# Patient Record
Sex: FEMALE | Race: WHITE | NOT HISPANIC OR LATINO
[De-identification: names, ages, dates, MRNs, and addresses within clinical notes are randomized per-mention and may not be internally consistent; named-entity substitution may affect disease eponyms.]

---

## 2017-12-01 ENCOUNTER — RX RENEWAL (OUTPATIENT)
Age: 82
End: 2017-12-01

## 2017-12-15 ENCOUNTER — RX RENEWAL (OUTPATIENT)
Age: 82
End: 2017-12-15

## 2017-12-19 ENCOUNTER — RECORD ABSTRACTING (OUTPATIENT)
Age: 82
End: 2017-12-19

## 2017-12-19 DIAGNOSIS — Z83.3 FAMILY HISTORY OF DIABETES MELLITUS: ICD-10-CM

## 2017-12-19 DIAGNOSIS — Z80.0 FAMILY HISTORY OF MALIGNANT NEOPLASM OF DIGESTIVE ORGANS: ICD-10-CM

## 2017-12-19 DIAGNOSIS — Z82.3 FAMILY HISTORY OF STROKE: ICD-10-CM

## 2017-12-19 DIAGNOSIS — Z82.49 FAMILY HISTORY OF ISCHEMIC HEART DISEASE AND OTHER DISEASES OF THE CIRCULATORY SYSTEM: ICD-10-CM

## 2017-12-19 DIAGNOSIS — Z87.891 PERSONAL HISTORY OF NICOTINE DEPENDENCE: ICD-10-CM

## 2017-12-19 DIAGNOSIS — Z81.1 FAMILY HISTORY OF ALCOHOL ABUSE AND DEPENDENCE: ICD-10-CM

## 2017-12-19 DIAGNOSIS — Z80.3 FAMILY HISTORY OF MALIGNANT NEOPLASM OF BREAST: ICD-10-CM

## 2017-12-19 DIAGNOSIS — Z80.8 FAMILY HISTORY OF MALIGNANT NEOPLASM OF OTHER ORGANS OR SYSTEMS: ICD-10-CM

## 2017-12-19 DIAGNOSIS — Z81.8 FAMILY HISTORY OF OTHER MENTAL AND BEHAVIORAL DISORDERS: ICD-10-CM

## 2018-01-25 ENCOUNTER — RX RENEWAL (OUTPATIENT)
Age: 83
End: 2018-01-25

## 2018-03-14 ENCOUNTER — RX RENEWAL (OUTPATIENT)
Age: 83
End: 2018-03-14

## 2018-04-02 ENCOUNTER — APPOINTMENT (OUTPATIENT)
Dept: FAMILY MEDICINE | Facility: CLINIC | Age: 83
End: 2018-04-02
Payer: MEDICARE

## 2018-04-02 VITALS
SYSTOLIC BLOOD PRESSURE: 120 MMHG | WEIGHT: 155 LBS | DIASTOLIC BLOOD PRESSURE: 78 MMHG | HEIGHT: 67 IN | BODY MASS INDEX: 24.33 KG/M2

## 2018-04-02 PROCEDURE — 99213 OFFICE O/P EST LOW 20 MIN: CPT | Mod: 25

## 2018-04-02 PROCEDURE — 36415 COLL VENOUS BLD VENIPUNCTURE: CPT

## 2018-04-03 LAB
ALBUMIN SERPL ELPH-MCNC: 4.2 G/DL
ALP BLD-CCNC: 51 U/L
ALT SERPL-CCNC: 23 U/L
ANION GAP SERPL CALC-SCNC: 15 MMOL/L
AST SERPL-CCNC: 27 U/L
BILIRUB SERPL-MCNC: 1 MG/DL
BUN SERPL-MCNC: 14 MG/DL
CALCIUM SERPL-MCNC: 9.4 MG/DL
CHLORIDE SERPL-SCNC: 104 MMOL/L
CHOLEST SERPL-MCNC: 125 MG/DL
CHOLEST/HDLC SERPL: 1.9 RATIO
CO2 SERPL-SCNC: 26 MMOL/L
CREAT SERPL-MCNC: 0.96 MG/DL
GLUCOSE SERPL-MCNC: 101 MG/DL
HDLC SERPL-MCNC: 67 MG/DL
LDLC SERPL CALC-MCNC: 47 MG/DL
POTASSIUM SERPL-SCNC: 4.3 MMOL/L
PROT SERPL-MCNC: 7.4 G/DL
SODIUM SERPL-SCNC: 145 MMOL/L
TRIGL SERPL-MCNC: 57 MG/DL

## 2018-05-26 ENCOUNTER — RX RENEWAL (OUTPATIENT)
Age: 83
End: 2018-05-26

## 2018-05-29 ENCOUNTER — RX RENEWAL (OUTPATIENT)
Age: 83
End: 2018-05-29

## 2018-07-27 PROBLEM — Z80.8 FAMILY HISTORY OF SKIN CANCER: Status: ACTIVE | Noted: 2017-12-19

## 2018-08-26 ENCOUNTER — RX RENEWAL (OUTPATIENT)
Age: 83
End: 2018-08-26

## 2018-09-24 ENCOUNTER — APPOINTMENT (OUTPATIENT)
Dept: FAMILY MEDICINE | Facility: CLINIC | Age: 83
End: 2018-09-24
Payer: MEDICARE

## 2018-09-24 ENCOUNTER — NON-APPOINTMENT (OUTPATIENT)
Age: 83
End: 2018-09-24

## 2018-09-24 VITALS
HEIGHT: 67 IN | BODY MASS INDEX: 23.86 KG/M2 | SYSTOLIC BLOOD PRESSURE: 122 MMHG | WEIGHT: 152 LBS | DIASTOLIC BLOOD PRESSURE: 84 MMHG

## 2018-09-24 LAB
BILIRUB UR QL STRIP: 0
CLARITY UR: CLEAR
COLLECTION METHOD: NORMAL
GLUCOSE UR-MCNC: 0
HCG UR QL: 0.2 EU/DL
HGB UR QL STRIP.AUTO: 0
KETONES UR-MCNC: 0
LEUKOCYTE ESTERASE UR QL STRIP: NORMAL
NITRITE UR QL STRIP: 0
PH UR STRIP: 7
PROT UR STRIP-MCNC: 0
SP GR UR STRIP: 1.01

## 2018-09-24 PROCEDURE — 92551 PURE TONE HEARING TEST AIR: CPT | Mod: 59,GY

## 2018-09-24 PROCEDURE — 99213 OFFICE O/P EST LOW 20 MIN: CPT | Mod: 25

## 2018-09-24 PROCEDURE — 36415 COLL VENOUS BLD VENIPUNCTURE: CPT

## 2018-09-24 PROCEDURE — G0439: CPT

## 2018-09-24 PROCEDURE — G0008: CPT

## 2018-09-24 PROCEDURE — 90686 IIV4 VACC NO PRSV 0.5 ML IM: CPT

## 2018-09-24 PROCEDURE — 93000 ELECTROCARDIOGRAM COMPLETE: CPT

## 2018-09-24 PROCEDURE — 81002 URINALYSIS NONAUTO W/O SCOPE: CPT

## 2018-09-24 NOTE — HEALTH RISK ASSESSMENT
[Very Good] : ~his/her~  mood as very good [One fall no injury in past year] : Patient reported one fall in the past year without injury [0] : 2) Feeling down, depressed, or hopeless: Not at all (0) [Patient reported colonoscopy was normal] : Patient reported colonoscopy was normal [None] : None [Alone] : lives alone [Retired] : retired [College] : College [] :  [# Of Children ___] : has [unfilled] children [Feels Safe at Home] : Feels safe at home [Fully functional (bathing, dressing, toileting, transferring, walking, feeding)] : Fully functional (bathing, dressing, toileting, transferring, walking, feeding) [Fully functional (using the telephone, shopping, preparing meals, housekeeping, doing laundry, using] : Fully functional and needs no help or supervision to perform IADLs (using the telephone, shopping, preparing meals, housekeeping, doing laundry, using transportation, managing medications and managing finances) [Smoke Detector] : smoke detector [Carbon Monoxide Detector] : carbon monoxide detector [Guns at Home] : guns at home [Seat Belt] :  uses seat belt [Sunscreen] : uses sunscreen [Discussed at today's visit] : Advance Directives Discussed at today's visit [Designated Healthcare Proxy] : Designated healthcare proxy [] : No [[id number]] : 0 [Change in mental status noted] : No change in mental status noted [Language] : denies difficulty with language [Behavior] : denies difficulty with behavior [Handling Complex Tasks] : denies difficulty handling complex tasks [High Risk Behavior] : no high risk behavior [Reports changes in hearing] : Reports no changes in hearing [Reports changes in vision] : Reports no changes in vision [Reports changes in dental health] : Reports no changes in dental health [Caregiver Concerns] : does not have caregiver concerns [ColonoscopyDate] : 03/14 [de-identified] : locked

## 2018-09-24 NOTE — REVIEW OF SYSTEMS
[Postnasal Drip] : postnasal drip [Negative] : Heme/Lymph [Nosebleed] : no nosebleeds [Nasal Discharge] : no nasal discharge [Sore Throat] : no sore throat

## 2018-09-24 NOTE — PHYSICAL EXAM

## 2018-09-26 LAB
ALBUMIN SERPL ELPH-MCNC: 4.3 G/DL
ALP BLD-CCNC: 53 U/L
ALT SERPL-CCNC: 14 U/L
ANION GAP SERPL CALC-SCNC: 16 MMOL/L
AST SERPL-CCNC: 22 U/L
BACTERIA UR CULT: ABNORMAL
BASOPHILS # BLD AUTO: 0.03 K/UL
BASOPHILS NFR BLD AUTO: 0.6 %
BILIRUB SERPL-MCNC: 1.4 MG/DL
BUN SERPL-MCNC: 12 MG/DL
CALCIUM SERPL-MCNC: 9.3 MG/DL
CHLORIDE SERPL-SCNC: 100 MMOL/L
CHOLEST SERPL-MCNC: 147 MG/DL
CHOLEST/HDLC SERPL: 1.9 RATIO
CO2 SERPL-SCNC: 27 MMOL/L
CREAT SERPL-MCNC: 0.78 MG/DL
EOSINOPHIL # BLD AUTO: 0.15 K/UL
EOSINOPHIL NFR BLD AUTO: 2.9 %
GLUCOSE SERPL-MCNC: 94 MG/DL
HCT VFR BLD CALC: 45.4 %
HDLC SERPL-MCNC: 76 MG/DL
HGB BLD-MCNC: 14.4 G/DL
IMM GRANULOCYTES NFR BLD AUTO: 0.2 %
LDLC SERPL CALC-MCNC: 55 MG/DL
LYMPHOCYTES # BLD AUTO: 1.12 K/UL
LYMPHOCYTES NFR BLD AUTO: 21.4 %
MAN DIFF?: NORMAL
MCHC RBC-ENTMCNC: 31.4 PG
MCHC RBC-ENTMCNC: 31.7 GM/DL
MCV RBC AUTO: 99.1 FL
MONOCYTES # BLD AUTO: 0.38 K/UL
MONOCYTES NFR BLD AUTO: 7.3 %
NEUTROPHILS # BLD AUTO: 3.55 K/UL
NEUTROPHILS NFR BLD AUTO: 67.6 %
PLATELET # BLD AUTO: 224 K/UL
POTASSIUM SERPL-SCNC: 4.5 MMOL/L
PROT SERPL-MCNC: 7.3 G/DL
RBC # BLD: 4.58 M/UL
RBC # FLD: 13.3 %
SODIUM SERPL-SCNC: 143 MMOL/L
T3FREE SERPL-MCNC: 2.64 PG/ML
T4 FREE SERPL-MCNC: 1.5 NG/DL
TRIGL SERPL-MCNC: 79 MG/DL
TSH SERPL-ACNC: 1.3 UIU/ML
WBC # FLD AUTO: 5.24 K/UL

## 2018-11-23 ENCOUNTER — RX RENEWAL (OUTPATIENT)
Age: 83
End: 2018-11-23

## 2018-11-24 ENCOUNTER — RX RENEWAL (OUTPATIENT)
Age: 83
End: 2018-11-24

## 2018-12-17 ENCOUNTER — APPOINTMENT (OUTPATIENT)
Dept: FAMILY MEDICINE | Facility: CLINIC | Age: 83
End: 2018-12-17
Payer: MEDICARE

## 2018-12-17 VITALS
DIASTOLIC BLOOD PRESSURE: 98 MMHG | WEIGHT: 152 LBS | BODY MASS INDEX: 23.86 KG/M2 | HEIGHT: 67 IN | SYSTOLIC BLOOD PRESSURE: 148 MMHG

## 2018-12-17 PROCEDURE — 99214 OFFICE O/P EST MOD 30 MIN: CPT

## 2018-12-17 NOTE — PLAN
[FreeTextEntry1] : Discussed potentially increasing Lexapro to 20, pt wants to stay on 10 for now. Will recheck if getting worse or persistent\par BP check 2-3

## 2018-12-17 NOTE — HISTORY OF PRESENT ILLNESS
[Hypertension] : Hypertension [Checks BP Sporadically] : The patient checks ~his/her~ blood pressure sporadically [<140/90] : Target blood pressure is <140/90 [At intermediate goal] : BP at intermediate goal [Doing Well] : Patient is doing well [Managed with medications] : managed with  medication [Moderate Intensity Therapy] : Patient is currently on moderate intensity statin  therapy [FreeTextEntry6] : BP check. [de-identified] : Cardiologist checked recently and BP high

## 2018-12-17 NOTE — HEALTH RISK ASSESSMENT
[] : No [Two or more falls in past year] : Patient reported two or more falls in the past year [2] : 2) Feeling down, depressed, or hopeless for more than half of the days (2) [[id number]] : 9

## 2019-01-07 ENCOUNTER — APPOINTMENT (OUTPATIENT)
Dept: FAMILY MEDICINE | Facility: CLINIC | Age: 84
End: 2019-01-07
Payer: MEDICARE

## 2019-01-07 VITALS
HEIGHT: 67 IN | HEART RATE: 80 BPM | DIASTOLIC BLOOD PRESSURE: 80 MMHG | WEIGHT: 152 LBS | BODY MASS INDEX: 23.86 KG/M2 | SYSTOLIC BLOOD PRESSURE: 150 MMHG

## 2019-01-07 PROCEDURE — 99213 OFFICE O/P EST LOW 20 MIN: CPT

## 2019-01-07 NOTE — PLAN
[FreeTextEntry1] : take 2 of the coreg and if tolerates, then next refill will send in to pharmacy for 25's

## 2019-01-07 NOTE — PHYSICAL EXAM
[No Acute Distress] : no acute distress [Normal Outer Ear/Nose] : the outer ears and nose were normal in appearance [No JVD] : no jugular venous distention [Soft] : abdomen soft [No CVA Tenderness] : no CVA  tenderness [No Joint Swelling] : no joint swelling

## 2019-01-07 NOTE — HISTORY OF PRESENT ILLNESS
[Hypertension] : Hypertension [Does not check BP] : The patient is not checking blood pressure [<140/90] : Target blood pressure is <140/90 [Near target goal] : BP near target goal [FreeTextEntry6] : BP check. Pt. started new medication last month, here for recheck.

## 2019-01-28 ENCOUNTER — APPOINTMENT (OUTPATIENT)
Dept: FAMILY MEDICINE | Facility: CLINIC | Age: 84
End: 2019-01-28
Payer: MEDICARE

## 2019-01-28 VITALS — SYSTOLIC BLOOD PRESSURE: 124 MMHG | DIASTOLIC BLOOD PRESSURE: 82 MMHG

## 2019-01-28 VITALS — WEIGHT: 152 LBS | BODY MASS INDEX: 23.86 KG/M2 | HEIGHT: 67 IN

## 2019-01-28 PROCEDURE — 36415 COLL VENOUS BLD VENIPUNCTURE: CPT

## 2019-01-28 PROCEDURE — 99213 OFFICE O/P EST LOW 20 MIN: CPT | Mod: 25

## 2019-01-28 NOTE — PHYSICAL EXAM
[No Respiratory Distress] : no respiratory distress  [Clear to Auscultation] : lungs were clear to auscultation bilaterally [Normal Rate] : normal rate  [Regular Rhythm] : with a regular rhythm [Normal S1, S2] : normal S1 and S2

## 2019-01-28 NOTE — HEALTH RISK ASSESSMENT
[One fall no injury in past year] : Patient reported one fall in the past year without injury [0] : 2) Feeling down, depressed, or hopeless: Not at all (0) [] : No [[id number]] : 0

## 2019-01-28 NOTE — COUNSELING
[Weight management counseling provided] : Weight management [Healthy eating counseling provided] : healthy eating [Smoking cessation counseling provided] : smoking cessation [Behavioral health counseling provided] : behavioral health  [Walking] : Walking [Low Salt Diet] : Low salt diet [None] : None [Good understanding] : Patient has a good understanding of lifestyle changes and the steps needed to achieve self management goals

## 2019-01-28 NOTE — HISTORY OF PRESENT ILLNESS
[Hypertension] : Hypertension [Patient was last seen on ___] : Patient was last seen on [unfilled] [Spouse] : spouse [Does not check BP] : The patient is not checking blood pressure [120/80] : Target blood pressure is 120/80 [At intermediate goal] : BP at intermediate goal [FreeTextEntry6] : BP check. [de-identified] : Machine stopped working properly

## 2019-03-12 ENCOUNTER — APPOINTMENT (OUTPATIENT)
Dept: FAMILY MEDICINE | Facility: CLINIC | Age: 84
End: 2019-03-12
Payer: MEDICARE

## 2019-03-12 VITALS
HEIGHT: 67 IN | DIASTOLIC BLOOD PRESSURE: 72 MMHG | SYSTOLIC BLOOD PRESSURE: 110 MMHG | WEIGHT: 152 LBS | BODY MASS INDEX: 23.86 KG/M2

## 2019-03-12 PROCEDURE — 36415 COLL VENOUS BLD VENIPUNCTURE: CPT

## 2019-03-12 PROCEDURE — 99213 OFFICE O/P EST LOW 20 MIN: CPT | Mod: 25

## 2019-03-12 NOTE — HISTORY OF PRESENT ILLNESS
[Hyperlipidemia] : Hyperlipidemia [Hypertension] : Hypertension [Does not check BP] : The patient is not checking blood pressure [<140/90] : Target blood pressure is <140/90 [Target goal met] : BP target goal met [Doing Well] : Patient is doing well [Managed with medications] : managed with  medication [High Intensity therapy] : Patient is currently on high intensity statin therapy [FreeTextEntry6] : Fasting cholesterol check.

## 2019-03-13 LAB
ALBUMIN SERPL ELPH-MCNC: 4.4 G/DL
ALP BLD-CCNC: 53 U/L
ALT SERPL-CCNC: 16 U/L
ANION GAP SERPL CALC-SCNC: 12 MMOL/L
AST SERPL-CCNC: 23 U/L
BILIRUB SERPL-MCNC: 0.9 MG/DL
BUN SERPL-MCNC: 16 MG/DL
CALCIUM SERPL-MCNC: 9.6 MG/DL
CHLORIDE SERPL-SCNC: 102 MMOL/L
CHOLEST SERPL-MCNC: 154 MG/DL
CHOLEST/HDLC SERPL: 1.9 RATIO
CO2 SERPL-SCNC: 30 MMOL/L
CREAT SERPL-MCNC: 0.9 MG/DL
GLUCOSE SERPL-MCNC: 100 MG/DL
HDLC SERPL-MCNC: 81 MG/DL
LDLC SERPL CALC-MCNC: 58 MG/DL
POTASSIUM SERPL-SCNC: 4.8 MMOL/L
PROT SERPL-MCNC: 7.6 G/DL
SODIUM SERPL-SCNC: 144 MMOL/L
TRIGL SERPL-MCNC: 76 MG/DL

## 2019-03-14 ENCOUNTER — RX RENEWAL (OUTPATIENT)
Age: 84
End: 2019-03-14

## 2019-03-17 ENCOUNTER — RX RENEWAL (OUTPATIENT)
Age: 84
End: 2019-03-17

## 2019-03-17 RX ORDER — CARVEDILOL 12.5 MG/1
12.5 TABLET, FILM COATED ORAL TWICE DAILY
Qty: 180 | Refills: 0 | Status: DISCONTINUED | COMMUNITY
Start: 2019-03-14 | End: 2019-03-17

## 2019-05-25 ENCOUNTER — RX RENEWAL (OUTPATIENT)
Age: 84
End: 2019-05-25

## 2019-06-10 ENCOUNTER — RX RENEWAL (OUTPATIENT)
Age: 84
End: 2019-06-10

## 2019-07-15 ENCOUNTER — APPOINTMENT (OUTPATIENT)
Dept: FAMILY MEDICINE | Facility: CLINIC | Age: 84
End: 2019-07-15
Payer: MEDICARE

## 2019-07-15 VITALS
WEIGHT: 152 LBS | HEIGHT: 67 IN | DIASTOLIC BLOOD PRESSURE: 80 MMHG | BODY MASS INDEX: 23.86 KG/M2 | SYSTOLIC BLOOD PRESSURE: 146 MMHG

## 2019-07-15 PROCEDURE — 99213 OFFICE O/P EST LOW 20 MIN: CPT

## 2019-07-15 NOTE — PLAN
[FreeTextEntry1] : Watch sodium\par Increase exercise\par Recheck BP in 2-3 more weeks. IF still high will increase the lisinopril

## 2019-07-15 NOTE — HISTORY OF PRESENT ILLNESS
[Hypertension] : Hypertension [FreeTextEntry6] : BP check. \par Was in the office with Dr. Polo and BP was slightly elevated. Pt. Not sure what the reading was. He did not change the dosage at that time. Here for recheck.

## 2019-07-23 ENCOUNTER — RX RENEWAL (OUTPATIENT)
Age: 84
End: 2019-07-23

## 2019-07-29 ENCOUNTER — APPOINTMENT (OUTPATIENT)
Dept: FAMILY MEDICINE | Facility: CLINIC | Age: 84
End: 2019-07-29
Payer: MEDICARE

## 2019-07-29 VITALS
DIASTOLIC BLOOD PRESSURE: 68 MMHG | WEIGHT: 152 LBS | SYSTOLIC BLOOD PRESSURE: 120 MMHG | HEIGHT: 67 IN | BODY MASS INDEX: 23.86 KG/M2

## 2019-07-29 PROCEDURE — 99213 OFFICE O/P EST LOW 20 MIN: CPT

## 2019-07-29 NOTE — HISTORY OF PRESENT ILLNESS
[Hypertension] : Hypertension [FreeTextEntry6] : BP check. [Does not check BP] : The patient is not checking blood pressure [<140/90] : Target blood pressure is <140/90 [Target goal met] : BP target goal met

## 2019-07-29 NOTE — HEALTH RISK ASSESSMENT
[] : No [Yes] : Yes [4 or more  times a week (4 pts)] : 4 or more  times a week (4 points) [1 or 2 (0 pts)] : 1 or 2 (0 points) [de-identified] : SBIRT screen on chart and patient appropriately counseled

## 2019-09-07 ENCOUNTER — RX RENEWAL (OUTPATIENT)
Age: 84
End: 2019-09-07

## 2019-10-02 ENCOUNTER — APPOINTMENT (OUTPATIENT)
Dept: FAMILY MEDICINE | Facility: CLINIC | Age: 84
End: 2019-10-02
Payer: MEDICARE

## 2019-10-02 VITALS
DIASTOLIC BLOOD PRESSURE: 68 MMHG | SYSTOLIC BLOOD PRESSURE: 110 MMHG | WEIGHT: 152 LBS | HEIGHT: 67 IN | BODY MASS INDEX: 23.86 KG/M2

## 2019-10-02 PROCEDURE — G0008: CPT

## 2019-10-02 PROCEDURE — 90662 IIV NO PRSV INCREASED AG IM: CPT

## 2019-10-02 PROCEDURE — 99213 OFFICE O/P EST LOW 20 MIN: CPT | Mod: 25

## 2019-10-02 NOTE — HEALTH RISK ASSESSMENT
[Yes] : Yes [4 or more  times a week (4 pts)] : 4 or more  times a week (4 points) [1 or 2 (0 pts)] : 1 or 2 (0 points) [Never (0 pts)] : Never (0 points) [No] : In the past 12 months have you used drugs other than those required for medical reasons? No [No falls in past year] : Patient reported no falls in the past year [0] : 2) Feeling down, depressed, or hopeless: Not at all (0) [] : No [Audit-CScore] : 4 [[id number]] : 0

## 2019-10-02 NOTE — HISTORY OF PRESENT ILLNESS
[FreeTextEntry8] : Flu vaccine.\par Pt. feeling well and has not had any recent colds or stomach viruses\par Bp check

## 2019-10-16 ENCOUNTER — NON-APPOINTMENT (OUTPATIENT)
Age: 84
End: 2019-10-16

## 2019-10-16 ENCOUNTER — APPOINTMENT (OUTPATIENT)
Dept: FAMILY MEDICINE | Facility: CLINIC | Age: 84
End: 2019-10-16
Payer: MEDICARE

## 2019-10-16 VITALS
WEIGHT: 150 LBS | HEIGHT: 67 IN | SYSTOLIC BLOOD PRESSURE: 120 MMHG | BODY MASS INDEX: 23.54 KG/M2 | DIASTOLIC BLOOD PRESSURE: 78 MMHG

## 2019-10-16 LAB
BILIRUB UR QL STRIP: 0
CLARITY UR: CLEAR
COLLECTION METHOD: NORMAL
GLUCOSE UR-MCNC: 0
HCG UR QL: 0.2 EU/DL
HGB UR QL STRIP.AUTO: 0
KETONES UR-MCNC: 0
LEUKOCYTE ESTERASE UR QL STRIP: NORMAL
NITRITE UR QL STRIP: 0
PH UR STRIP: 7.5
PROT UR STRIP-MCNC: 0
SP GR UR STRIP: 1.01

## 2019-10-16 PROCEDURE — G0439: CPT

## 2019-10-16 PROCEDURE — G0442 ANNUAL ALCOHOL SCREEN 15 MIN: CPT | Mod: 59

## 2019-10-16 PROCEDURE — 93000 ELECTROCARDIOGRAM COMPLETE: CPT

## 2019-10-16 PROCEDURE — 36415 COLL VENOUS BLD VENIPUNCTURE: CPT

## 2019-10-16 PROCEDURE — 81002 URINALYSIS NONAUTO W/O SCOPE: CPT

## 2019-10-16 PROCEDURE — G0444 DEPRESSION SCREEN ANNUAL: CPT | Mod: 59

## 2019-10-16 PROCEDURE — G0443: CPT | Mod: 59

## 2019-10-16 PROCEDURE — 99213 OFFICE O/P EST LOW 20 MIN: CPT | Mod: 25

## 2019-10-16 NOTE — PHYSICAL EXAM
[No Acute Distress] : no acute distress [Well Nourished] : well nourished [Well Developed] : well developed [Well-Appearing] : well-appearing [Normal Sclera/Conjunctiva] : normal sclera/conjunctiva [PERRL] : pupils equal round and reactive to light [EOMI] : extraocular movements intact [Normal Outer Ear/Nose] : the outer ears and nose were normal in appearance [Normal Oropharynx] : the oropharynx was normal [No JVD] : no jugular venous distention [No Lymphadenopathy] : no lymphadenopathy [Supple] : supple [Thyroid Normal, No Nodules] : the thyroid was normal and there were no nodules present [No Respiratory Distress] : no respiratory distress  [No Accessory Muscle Use] : no accessory muscle use [Clear to Auscultation] : lungs were clear to auscultation bilaterally [Normal Rate] : normal rate  [Regular Rhythm] : with a regular rhythm [Normal S1, S2] : normal S1 and S2 [No Murmur] : no murmur heard [No Carotid Bruits] : no carotid bruits [No Abdominal Bruit] : a ~M bruit was not heard ~T in the abdomen [No Varicosities] : no varicosities [Pedal Pulses Present] : the pedal pulses are present [No Edema] : there was no peripheral edema [No Palpable Aorta] : no palpable aorta [No Extremity Clubbing/Cyanosis] : no extremity clubbing/cyanosis [Soft] : abdomen soft [Non Tender] : non-tender [Non-distended] : non-distended [No Masses] : no abdominal mass palpated [No HSM] : no HSM [Normal Bowel Sounds] : normal bowel sounds [Normal Posterior Cervical Nodes] : no posterior cervical lymphadenopathy [Normal Anterior Cervical Nodes] : no anterior cervical lymphadenopathy [No CVA Tenderness] : no CVA  tenderness [No Spinal Tenderness] : no spinal tenderness [No Joint Swelling] : no joint swelling [Grossly Normal Strength/Tone] : grossly normal strength/tone [No Rash] : no rash [Coordination Grossly Intact] : coordination grossly intact [No Focal Deficits] : no focal deficits [Normal Gait] : normal gait [Deep Tendon Reflexes (DTR)] : deep tendon reflexes were 2+ and symmetric [Normal Affect] : the affect was normal [Normal Insight/Judgement] : insight and judgment were intact [de-identified] : declined [FreeTextEntry1] : 500 R 30 L 20   1000 R 30 L 30   2000 R 40 L 20   4000 R 45 L 40

## 2019-10-16 NOTE — HEALTH RISK ASSESSMENT
[Good] : ~his/her~  mood as  good [Yes] : Yes [2 - 3 times a week (3 pts)] : 2 - 3  times a week (3 points) [1 or 2 (0 pts)] : 1 or 2 (0 points) [Never (0 pts)] : Never (0 points) [No] : In the past 12 months have you used drugs other than those required for medical reasons? No [No falls in past year] : Patient reported no falls in the past year [0] : 2) Feeling down, depressed, or hopeless: Not at all (0) [HIV test declined] : HIV test declined [Hepatitis C test declined] : Hepatitis C test declined [Change in mental status noted] : Change in mental status noted [None] : None [With Family] : lives with family [Retired] : retired [College] : College [] :  [# Of Children ___] : has [unfilled] children [Sexually Active] : sexually active [Feels Safe at Home] : Feels safe at home [Fully functional (bathing, dressing, toileting, transferring, walking, feeding)] : Fully functional (bathing, dressing, toileting, transferring, walking, feeding) [Fully functional (using the telephone, shopping, preparing meals, housekeeping, doing laundry, using] : Fully functional and needs no help or supervision to perform IADLs (using the telephone, shopping, preparing meals, housekeeping, doing laundry, using transportation, managing medications and managing finances) [Smoke Detector] : smoke detector [Carbon Monoxide Detector] : carbon monoxide detector [Guns at Home] : guns at home [Seat Belt] :  uses seat belt [Sunscreen] : uses sunscreen [With Patient/Caregiver] : With Patient/Caregiver [Designated Healthcare Proxy] : Designated healthcare proxy [Relationship: ___] : Relationship: [unfilled] [] : No [de-identified] : SBIRT screen on chart and patient appropriately counseled3 [[id number]] : 0 [Language] : denies difficulty with language [Behavior] : denies difficulty with behavior [Learning/Retaining New Information] : denies difficulty learning/retaining new information [Handling Complex Tasks] : denies difficulty handling complex tasks [Reasoning] : denies difficulty with reasoning [Spatial Ability and Orientation] : denies difficulty with spatial ability and orientation [High Risk Behavior] : no high risk behavior [Reports changes in dental health] : Reports no changes in dental health [Safety elements used in home] : no safety elements used in home [de-identified] : locked [AdvancecareDate] : 10/19

## 2019-10-16 NOTE — COUNSELING
[Fall prevention counseling provided] : Fall prevention counseling provided [Behavioral health counseling provided] : Behavioral health counseling provided [AUDIT-C Screening administered and reviewed] : AUDIT-C Screening administered and reviewed [Hazards of at-risk alcohol use discussed] : Hazards of at-risk alcohol use discussed [Strategies to reduce or eliminate alcohol use discussed] : Strategies to reduce or eliminate alcohol use discussed [None] : None [Good understanding] : Patient has a good understanding of lifestyle changes and steps needed to achieve self management goal [FreeTextEntry2] : Cut down to less than 7 drinks per week [FreeTextEntry1] : 10

## 2019-10-19 LAB
ALBUMIN SERPL ELPH-MCNC: 4.1 G/DL
ALP BLD-CCNC: 46 U/L
ALT SERPL-CCNC: 15 U/L
ANION GAP SERPL CALC-SCNC: 13 MMOL/L
AST SERPL-CCNC: 20 U/L
BASOPHILS # BLD AUTO: 0.03 K/UL
BASOPHILS NFR BLD AUTO: 0.6 %
BILIRUB SERPL-MCNC: 1.6 MG/DL
BUN SERPL-MCNC: 16 MG/DL
CALCIUM SERPL-MCNC: 9.7 MG/DL
CHLORIDE SERPL-SCNC: 102 MMOL/L
CHOLEST SERPL-MCNC: 151 MG/DL
CHOLEST/HDLC SERPL: 2 RATIO
CO2 SERPL-SCNC: 27 MMOL/L
CREAT SERPL-MCNC: 0.8 MG/DL
EOSINOPHIL # BLD AUTO: 0.1 K/UL
EOSINOPHIL NFR BLD AUTO: 1.9 %
GLUCOSE SERPL-MCNC: 102 MG/DL
HCT VFR BLD CALC: 45.7 %
HDLC SERPL-MCNC: 74 MG/DL
HGB BLD-MCNC: 14.3 G/DL
IMM GRANULOCYTES NFR BLD AUTO: 0.2 %
LDLC SERPL CALC-MCNC: 63 MG/DL
LYMPHOCYTES # BLD AUTO: 1.3 K/UL
LYMPHOCYTES NFR BLD AUTO: 24.3 %
MAN DIFF?: NORMAL
MCHC RBC-ENTMCNC: 31.3 GM/DL
MCHC RBC-ENTMCNC: 32.1 PG
MCV RBC AUTO: 102.5 FL
MONOCYTES # BLD AUTO: 0.4 K/UL
MONOCYTES NFR BLD AUTO: 7.5 %
NEUTROPHILS # BLD AUTO: 3.51 K/UL
NEUTROPHILS NFR BLD AUTO: 65.5 %
PLATELET # BLD AUTO: 204 K/UL
POTASSIUM SERPL-SCNC: 4.5 MMOL/L
PROT SERPL-MCNC: 7.1 G/DL
RBC # BLD: 4.46 M/UL
RBC # FLD: 12.3 %
SODIUM SERPL-SCNC: 142 MMOL/L
T3FREE SERPL-MCNC: 2.58 PG/ML
T4 FREE SERPL-MCNC: 1.3 NG/DL
TRIGL SERPL-MCNC: 68 MG/DL
TSH SERPL-ACNC: 1.67 UIU/ML
WBC # FLD AUTO: 5.35 K/UL

## 2019-10-21 ENCOUNTER — RX RENEWAL (OUTPATIENT)
Age: 84
End: 2019-10-21

## 2019-11-21 ENCOUNTER — RX RENEWAL (OUTPATIENT)
Age: 84
End: 2019-11-21

## 2019-12-01 ENCOUNTER — RX RENEWAL (OUTPATIENT)
Age: 84
End: 2019-12-01

## 2020-01-25 ENCOUNTER — RX RENEWAL (OUTPATIENT)
Age: 85
End: 2020-01-25

## 2020-03-09 ENCOUNTER — RX RENEWAL (OUTPATIENT)
Age: 85
End: 2020-03-09

## 2020-05-14 ENCOUNTER — APPOINTMENT (OUTPATIENT)
Dept: FAMILY MEDICINE | Facility: CLINIC | Age: 85
End: 2020-05-14
Payer: MEDICARE

## 2020-05-14 VITALS — HEIGHT: 67 IN | DIASTOLIC BLOOD PRESSURE: 83 MMHG | SYSTOLIC BLOOD PRESSURE: 127 MMHG | HEART RATE: 70 BPM

## 2020-05-14 PROCEDURE — 99213 OFFICE O/P EST LOW 20 MIN: CPT | Mod: 25

## 2020-05-14 PROCEDURE — 36415 COLL VENOUS BLD VENIPUNCTURE: CPT

## 2020-05-14 RX ORDER — LISINOPRIL 10 MG/1
10 TABLET ORAL DAILY
Qty: 90 | Refills: 0 | Status: DISCONTINUED | COMMUNITY
Start: 2018-12-17 | End: 2020-05-14

## 2020-05-14 NOTE — HISTORY OF PRESENT ILLNESS
[Hypertension] : Hypertension [Does not check BP] : The patient is not checking blood pressure [Target goal met] : BP target goal met [<140/90] : Target blood pressure is <140/90

## 2020-05-14 NOTE — HEALTH RISK ASSESSMENT
[Yes] : Yes [2 - 4 times a month (2 pts)] : 2-4 times a month (2 points) [1 or 2 (0 pts)] : 1 or 2 (0 points) [Never (0 pts)] : Never (0 points) [No falls in past year] : Patient reported no falls in the past year [0] : 2) Feeling down, depressed, or hopeless: Not at all (0) [] : No [[id number]] : 0

## 2020-05-15 ENCOUNTER — RX RENEWAL (OUTPATIENT)
Age: 85
End: 2020-05-15

## 2020-05-16 LAB
ALBUMIN SERPL ELPH-MCNC: 4.3 G/DL
ALP BLD-CCNC: 43 U/L
ALT SERPL-CCNC: 17 U/L
ANION GAP SERPL CALC-SCNC: 16 MMOL/L
AST SERPL-CCNC: 20 U/L
BILIRUB SERPL-MCNC: 1.4 MG/DL
BUN SERPL-MCNC: 17 MG/DL
CALCIUM SERPL-MCNC: 9.7 MG/DL
CHLORIDE SERPL-SCNC: 98 MMOL/L
CHOLEST SERPL-MCNC: 152 MG/DL
CHOLEST/HDLC SERPL: 2.1 RATIO
CO2 SERPL-SCNC: 25 MMOL/L
CREAT SERPL-MCNC: 0.77 MG/DL
GLUCOSE SERPL-MCNC: 113 MG/DL
HDLC SERPL-MCNC: 72 MG/DL
LDLC SERPL CALC-MCNC: 65 MG/DL
POTASSIUM SERPL-SCNC: 4.1 MMOL/L
PROT SERPL-MCNC: 7.1 G/DL
SODIUM SERPL-SCNC: 140 MMOL/L
TRIGL SERPL-MCNC: 74 MG/DL

## 2020-07-28 ENCOUNTER — RX RENEWAL (OUTPATIENT)
Age: 85
End: 2020-07-28

## 2020-08-14 ENCOUNTER — APPOINTMENT (OUTPATIENT)
Dept: FAMILY MEDICINE | Facility: CLINIC | Age: 85
End: 2020-08-14
Payer: MEDICARE

## 2020-08-14 PROCEDURE — 99213 OFFICE O/P EST LOW 20 MIN: CPT

## 2020-08-14 NOTE — HEALTH RISK ASSESSMENT
[] : No [Yes] : Yes [2 - 3 times a week (3 pts)] : 2 - 3  times a week (3 points) [1 or 2 (0 pts)] : 1 or 2 (0 points) [Never (0 pts)] : Never (0 points) [No] : In the past 12 months have you used drugs other than those required for medical reasons? No [No falls in past year] : Patient reported no falls in the past year [0] : 2) Feeling down, depressed, or hopeless: Not at all (0) [de-identified] : SBIRT screen on chart and patient appropriately counseled [[id number]] : 0 [Audit-CScore] : 3

## 2020-08-31 ENCOUNTER — APPOINTMENT (OUTPATIENT)
Dept: NEUROLOGY | Facility: CLINIC | Age: 85
End: 2020-08-31
Payer: MEDICARE

## 2020-08-31 VITALS
TEMPERATURE: 97.5 F | SYSTOLIC BLOOD PRESSURE: 140 MMHG | BODY MASS INDEX: 23.54 KG/M2 | HEIGHT: 67 IN | WEIGHT: 150 LBS | HEART RATE: 72 BPM | DIASTOLIC BLOOD PRESSURE: 80 MMHG

## 2020-08-31 PROCEDURE — 99204 OFFICE O/P NEW MOD 45 MIN: CPT

## 2020-08-31 NOTE — PHYSICAL EXAM
[FreeTextEntry1] : Examination:\par Constitutional: normal, no apparent distress\par Eyes: normal conjunctiva b/l, no ptosis, visual fields full\par Respiratory: no respiratory distress, normal effort, normal auscultation\par Cardiovascular: normal rate, rhythm, no murmurs\par Neck: supple, no masses\par Vascular: carotids normal\par Skin: normal color, no rashes\par Psych: normal mood, affect\par \par Neurological:\par Memory: oriented to person, place, time\par Language intact/no aphasia\par Cranial Nerves: II-XII intact, Pupils equally round and reactive to light, ocular muscles/movements intact, no ptosis, no facial weakness, tongue protrudes normally in the midline, \par Motor: normal tone, no pronator drift, full strength in all four extremities in the proximal and distal muscle groups. + bradykinesia\par Coordination: decreased amplitude of finger tap and rapid alt. movements, worse on left. + cogwheeling on left. + rest tremor on left\par Sensory: intact to light touch, joint position sense, slightly decreased vibration in feet\par DTRs: symmetric, 1+ in b/l triceps, 1+ in b/l biceps, 1+ in b/l brachioradialis, 1+ in bilateral patellars, trace in bilateral Achilles, Babinskis negative bilaterally\par Gait: short steps with significantly decreased arm swing.\par \par

## 2020-08-31 NOTE — HISTORY OF PRESENT ILLNESS
[FreeTextEntry1] : She has noticed hand tremors over the last year, predominantly on the left side.\par She is right handed. \par She notices twitching of the left thumb.\par She believes that stress, anxiety and fatigue make it worse.\par Sometimes the tremors interfere with her activity such as use of a utensil.\par She does not notice the tremor as much at rest.\par She has not noticed any change with caffeine or alcohol.\par She is not aware of any family history of tremors other than a first cousin with Parkinson's disease.\par She says that she walks slow.She does not feel as steady on her feet as she did a few years ago.\par She denies loss of sense of smell.\par She denies significant constipation.\par She is not aware of recent dream enactment behavior.\par About 2-3 years ago she did find herself on the floor after jumping out of bed from a traumatic dream. This happened two times within a relatively short period of time.

## 2020-08-31 NOTE — REVIEW OF SYSTEMS
[As Noted in HPI] : as noted in HPI [Sleep Disturbances] : sleep disturbances [Depression] : depression [Incontinence] : incontinence [Negative] : Gastrointestinal

## 2020-08-31 NOTE — CONSULT LETTER
[Dear  ___] : Dear  [unfilled], [Consult Letter:] : I had the pleasure of evaluating your patient, [unfilled]. [Please see my note below.] : Please see my note below. [Consult Closing:] : Thank you very much for allowing me to participate in the care of this patient.  If you have any questions, please do not hesitate to contact me. [FreeTextEntry2] : Cali Polanco [FreeTextEntry3] : Sincerely,\par \par \par Cira Kirkpatrick MD\par Diplomate, [employer] Academy [employer] Neu[employer]\par Board Certified in the Subspecialty of Clinical Neurophysiology\par Board Certified in the Subspecialty of Sleep Medicine\par Board Certified in the Subspecialty of Epilepsy\par

## 2020-08-31 NOTE — DISCUSSION/SUMMARY
[FreeTextEntry1] : Ms. 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 Bernstein and her .\par I am ordering an MRI brain to rule out causes of secondary parkinsonism.\par \par We discussed treatment options. Since the symptoms are mild she would like to hold off on daily medications at this time. If symptoms worsen I will start carbidopa/levodopa.\par We discussed the importance of daily exercise.\par She should also inform me of any other episodes of dream enactment.\par We discussed the importance of  safe bedroom environment.\par I will follow up with Ms. Bernstein in about three months to reassess. She should call if anything changes before that time.

## 2020-09-12 ENCOUNTER — OUTPATIENT (OUTPATIENT)
Dept: OUTPATIENT SERVICES | Facility: HOSPITAL | Age: 85
LOS: 1 days | End: 2020-09-12
Payer: MEDICARE

## 2020-09-12 ENCOUNTER — APPOINTMENT (OUTPATIENT)
Dept: MRI IMAGING | Facility: CLINIC | Age: 85
End: 2020-09-12
Payer: MEDICARE

## 2020-09-12 DIAGNOSIS — G20 PARKINSON'S DISEASE: ICD-10-CM

## 2020-09-12 PROCEDURE — 70551 MRI BRAIN STEM W/O DYE: CPT

## 2020-09-12 PROCEDURE — 70551 MRI BRAIN STEM W/O DYE: CPT | Mod: 26

## 2020-10-19 ENCOUNTER — APPOINTMENT (OUTPATIENT)
Dept: FAMILY MEDICINE | Facility: CLINIC | Age: 85
End: 2020-10-19
Payer: MEDICARE

## 2020-10-19 ENCOUNTER — NON-APPOINTMENT (OUTPATIENT)
Age: 85
End: 2020-10-19

## 2020-10-19 VITALS
WEIGHT: 144 LBS | TEMPERATURE: 97.4 F | SYSTOLIC BLOOD PRESSURE: 109 MMHG | HEART RATE: 83 BPM | OXYGEN SATURATION: 100 % | DIASTOLIC BLOOD PRESSURE: 69 MMHG | HEIGHT: 67 IN | BODY MASS INDEX: 22.6 KG/M2

## 2020-10-19 LAB
BILIRUB UR QL STRIP: NORMAL
CLARITY UR: CLEAR
COLLECTION METHOD: NORMAL
GLUCOSE UR-MCNC: NORMAL
HCG UR QL: 0.2 EU/DL
HGB UR QL STRIP.AUTO: ABNORMAL
KETONES UR-MCNC: NORMAL
LEUKOCYTE ESTERASE UR QL STRIP: NORMAL
NITRITE UR QL STRIP: NORMAL
PH UR STRIP: 7.5
PROT UR STRIP-MCNC: NORMAL
SP GR UR STRIP: 1.02

## 2020-10-19 PROCEDURE — 90662 IIV NO PRSV INCREASED AG IM: CPT

## 2020-10-19 PROCEDURE — 36415 COLL VENOUS BLD VENIPUNCTURE: CPT

## 2020-10-19 PROCEDURE — 99213 OFFICE O/P EST LOW 20 MIN: CPT | Mod: 25

## 2020-10-19 PROCEDURE — 93000 ELECTROCARDIOGRAM COMPLETE: CPT | Mod: 59

## 2020-10-19 PROCEDURE — 81002 URINALYSIS NONAUTO W/O SCOPE: CPT

## 2020-10-19 PROCEDURE — G0444 DEPRESSION SCREEN ANNUAL: CPT | Mod: 59

## 2020-10-19 PROCEDURE — G0439: CPT

## 2020-10-19 PROCEDURE — G0008: CPT

## 2020-10-19 NOTE — HEALTH RISK ASSESSMENT
[Very Good] : ~his/her~  mood as very good [Yes] : Yes [1 or 2 (0 pts)] : 1 or 2 (0 points) [Monthly or less (1 pt)] : Monthly or less (1 point) [No] : In the past 12 months have you used drugs other than those required for medical reasons? No [Never (0 pts)] : Never (0 points) [No falls in past year] : Patient reported no falls in the past year [0] : 2) Feeling down, depressed, or hopeless: Not at all (0) [Audit-CScore] : 1 [[id number]] : 0 [Patient reported mammogram was normal] : Patient reported mammogram was normal [Hepatitis C test declined] : Hepatitis C test declined [Change in mental status noted] : No change in mental status noted [HIV test declined] : HIV test declined [Language] : denies difficulty with language [Behavior] : denies difficulty with behavior [Learning/Retaining New Information] : denies difficulty learning/retaining new information [Reasoning] : denies difficulty with reasoning [Handling Complex Tasks] : denies difficulty handling complex tasks [With Family] : lives with family [Spatial Ability and Orientation] : denies difficulty with spatial ability and orientation [None] : None [College] : College [] :  [Retired] : retired [# Of Children ___] : has [unfilled] children [Sexually Active] : sexually active [Feels Safe at Home] : Feels safe at home [High Risk Behavior] : no high risk behavior [Fully functional (bathing, dressing, toileting, transferring, walking, feeding)] : Fully functional (bathing, dressing, toileting, transferring, walking, feeding) [Reports changes in hearing] : Reports no changes in hearing [Reports changes in vision] : Reports no changes in vision [Fully functional (using the telephone, shopping, preparing meals, housekeeping, doing laundry, using] : Fully functional and needs no help or supervision to perform IADLs (using the telephone, shopping, preparing meals, housekeeping, doing laundry, using transportation, managing medications and managing finances) [Carbon Monoxide Detector] : carbon monoxide detector [Reports changes in dental health] : Reports no changes in dental health [Smoke Detector] : smoke detector [Safety elements used in home] : safety elements used in home [Guns at Home] : guns at home [Seat Belt] :  uses seat belt [Sunscreen] : uses sunscreen [MammogramDate] : 12/19 [With Patient/Caregiver] : With Patient/Caregiver [Designated Healthcare Proxy] : Designated healthcare proxy [de-identified] : Locked [Relationship: ___] : Relationship: [unfilled] [AdvancecareDate] : 10/20

## 2020-10-19 NOTE — PHYSICAL EXAM
[No Acute Distress] : no acute distress [Well Nourished] : well nourished [Well Developed] : well developed [Well-Appearing] : well-appearing [Normal Sclera/Conjunctiva] : normal sclera/conjunctiva [PERRL] : pupils equal round and reactive to light [Normal Outer Ear/Nose] : the outer ears and nose were normal in appearance [Normal Oropharynx] : the oropharynx was normal [EOMI] : extraocular movements intact [No Lymphadenopathy] : no lymphadenopathy [No JVD] : no jugular venous distention [Thyroid Normal, No Nodules] : the thyroid was normal and there were no nodules present [Supple] : supple [Clear to Auscultation] : lungs were clear to auscultation bilaterally [No Accessory Muscle Use] : no accessory muscle use [No Respiratory Distress] : no respiratory distress  [Normal Rate] : normal rate  [Normal S1, S2] : normal S1 and S2 [Regular Rhythm] : with a regular rhythm [No Murmur] : no murmur heard [No Carotid Bruits] : no carotid bruits [No Abdominal Bruit] : a ~M bruit was not heard ~T in the abdomen [No Varicosities] : no varicosities [No Palpable Aorta] : no palpable aorta [Pedal Pulses Present] : the pedal pulses are present [No Edema] : there was no peripheral edema [No Extremity Clubbing/Cyanosis] : no extremity clubbing/cyanosis [Soft] : abdomen soft [Non Tender] : non-tender [No Masses] : no abdominal mass palpated [Non-distended] : non-distended [Normal Bowel Sounds] : normal bowel sounds [No HSM] : no HSM [Normal Anterior Cervical Nodes] : no anterior cervical lymphadenopathy [Normal Posterior Cervical Nodes] : no posterior cervical lymphadenopathy [No Spinal Tenderness] : no spinal tenderness [No CVA Tenderness] : no CVA  tenderness [No Rash] : no rash [No Joint Swelling] : no joint swelling [Grossly Normal Strength/Tone] : grossly normal strength/tone [Coordination Grossly Intact] : coordination grossly intact [No Focal Deficits] : no focal deficits [Normal Affect] : the affect was normal [Normal Insight/Judgement] : insight and judgment were intact [Normal Gait] : normal gait

## 2020-10-20 LAB
ALBUMIN SERPL ELPH-MCNC: 4.4 G/DL
ALP BLD-CCNC: 47 U/L
ALT SERPL-CCNC: 18 U/L
ANION GAP SERPL CALC-SCNC: 12 MMOL/L
AST SERPL-CCNC: 18 U/L
BASOPHILS # BLD AUTO: 0.04 K/UL
BASOPHILS NFR BLD AUTO: 0.8 %
BILIRUB SERPL-MCNC: 1.8 MG/DL
BUN SERPL-MCNC: 16 MG/DL
CALCIUM SERPL-MCNC: 9.7 MG/DL
CHLORIDE SERPL-SCNC: 95 MMOL/L
CHOLEST SERPL-MCNC: 161 MG/DL
CHOLEST/HDLC SERPL: 2 RATIO
CO2 SERPL-SCNC: 31 MMOL/L
CREAT SERPL-MCNC: 0.84 MG/DL
EOSINOPHIL # BLD AUTO: 0.1 K/UL
EOSINOPHIL NFR BLD AUTO: 2 %
GLUCOSE SERPL-MCNC: 103 MG/DL
HCT VFR BLD CALC: 45.3 %
HDLC SERPL-MCNC: 81 MG/DL
HGB BLD-MCNC: 14.4 G/DL
IMM GRANULOCYTES NFR BLD AUTO: 0.2 %
LDLC SERPL CALC-MCNC: 69 MG/DL
LYMPHOCYTES # BLD AUTO: 1.1 K/UL
LYMPHOCYTES NFR BLD AUTO: 22.4 %
MAN DIFF?: NORMAL
MCHC RBC-ENTMCNC: 31.8 GM/DL
MCHC RBC-ENTMCNC: 32.1 PG
MCV RBC AUTO: 100.9 FL
MONOCYTES # BLD AUTO: 0.34 K/UL
MONOCYTES NFR BLD AUTO: 6.9 %
NEUTROPHILS # BLD AUTO: 3.32 K/UL
NEUTROPHILS NFR BLD AUTO: 67.7 %
PLATELET # BLD AUTO: 226 K/UL
POTASSIUM SERPL-SCNC: 4.2 MMOL/L
PROT SERPL-MCNC: 7.1 G/DL
RBC # BLD: 4.49 M/UL
RBC # FLD: 12.7 %
SODIUM SERPL-SCNC: 137 MMOL/L
T3FREE SERPL-MCNC: 2.72 PG/ML
T4 FREE SERPL-MCNC: 1.4 NG/DL
TRIGL SERPL-MCNC: 57 MG/DL
TSH SERPL-ACNC: 1.29 UIU/ML
WBC # FLD AUTO: 4.91 K/UL

## 2020-10-26 ENCOUNTER — RX RENEWAL (OUTPATIENT)
Age: 85
End: 2020-10-26

## 2020-11-11 ENCOUNTER — RX RENEWAL (OUTPATIENT)
Age: 85
End: 2020-11-11

## 2020-12-15 ENCOUNTER — APPOINTMENT (OUTPATIENT)
Dept: NEUROLOGY | Facility: CLINIC | Age: 85
End: 2020-12-15
Payer: MEDICARE

## 2020-12-15 VITALS
SYSTOLIC BLOOD PRESSURE: 110 MMHG | WEIGHT: 144 LBS | HEART RATE: 84 BPM | HEIGHT: 67 IN | TEMPERATURE: 97.5 F | BODY MASS INDEX: 22.6 KG/M2 | DIASTOLIC BLOOD PRESSURE: 70 MMHG

## 2020-12-15 PROCEDURE — 99214 OFFICE O/P EST MOD 30 MIN: CPT

## 2020-12-15 NOTE — HISTORY OF PRESENT ILLNESS
[FreeTextEntry1] : I last saw Ms. Bernstein on 8/31/20.\par She is here today with her .\par \par She states that it is getting harder to do certain tasks such as using utensils/cutting meat. She says that it is harder to do simple things that she takes for granted.\par \par She has not noted any new balance problems but sometimes feels a little weak due to drops in BP in the morning.\par She denies any recent constipation or loss of sense of smell.\par She denies having any difficulty swallowing.\par She reports one episode since August of dream enactment behavior. She woke up on the floor running away from something.\par She sleeps in separate rooms from her . He is not aware of snoring.

## 2020-12-15 NOTE — PHYSICAL EXAM
[FreeTextEntry1] : Examination:\par Constitutional: normal, no apparent distress\par Eyes: normal conjunctiva b/l, no ptosis, visual fields full\par Respiratory: no respiratory distress, normal effort, normal auscultation\par Cardiovascular: normal rate, rhythm, no murmurs\par Neck: supple, no masses\par Vascular: carotids normal\par Skin: normal color, no rashes\par Psych: normal mood, affect\par \par Neurological:\par Memory: oriented to person, place, time\par Language intact/no aphasia\par Cranial Nerves: II-XII intact, Pupils equally round and reactive to light, ocular muscles/movements intact, no ptosis, no facial weakness, tongue protrudes normally in the midline, \par Motor: normal tone, no pronator drift, full strength in all four extremities in the proximal and distal muscle groups. + bradykinesia\par Coordination: decreased amplitude of finger tap and rapid alt. movements, worse on left. + b/l cogwheeling on left > right. + rest tremor on left\par Sensory: intact to light touch,\par DTRs: symmetric, 1+ in b/l triceps, 1+ in b/l biceps, 1+ in b/l brachioradialis, 1+ in bilateral patellars, trace in bilateral Achilles, Babinskis negative bilaterally\par Gait: short steps with significantly decreased arm swing.\par

## 2020-12-15 NOTE — DISCUSSION/SUMMARY
[FreeTextEntry1] : Ms. Bernstein is an 85 year old woman with one year of tremors, the left hand more affected than the right hand.\par In addition to tremors, she has  cogwheeling and decreased arm swing on exam as well as bradykinesia.\par She also has a history suggestive of REM Behavior Disorder.\par \par Parkinson's Disease\par -She now feels ready to try medication.\par -Will prescribe Sinemet 25/100. Start with 1 tab daily x 3 days then 1 tab bid x 3 days and then 1 tab TID. Discussed timing of meds to optimize effects during waking hours.\par -Potential side effects discussed. 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 If not effective, can increase in 3 mg increments to 9 mg\par -Bedroom safety\par -She would like to hold off on sleep study for now. If problems persist will order sleep study.\par \par f/u 6-8 weeks, sooner if needed.\par

## 2020-12-15 NOTE — DATA REVIEWED
[No studies available for review at this time.] : No studies available for review at this time. [de-identified] : MRI head no contrast 9/12/20:\par No acute infarct. Mild periventricular and subcortical white matter chronic microvascular ischemic changes

## 2021-01-25 ENCOUNTER — RX RENEWAL (OUTPATIENT)
Age: 86
End: 2021-01-25

## 2021-02-25 ENCOUNTER — APPOINTMENT (OUTPATIENT)
Dept: NEUROLOGY | Facility: CLINIC | Age: 86
End: 2021-02-25
Payer: MEDICARE

## 2021-02-25 VITALS
HEART RATE: 71 BPM | DIASTOLIC BLOOD PRESSURE: 82 MMHG | WEIGHT: 140 LBS | SYSTOLIC BLOOD PRESSURE: 134 MMHG | BODY MASS INDEX: 21.97 KG/M2 | TEMPERATURE: 97 F | HEIGHT: 67 IN

## 2021-02-25 PROCEDURE — 99213 OFFICE O/P EST LOW 20 MIN: CPT

## 2021-02-25 NOTE — PHYSICAL EXAM
[FreeTextEntry1] : Examination:\par Constitutional: normal, no apparent distress\par Eyes: normal conjunctiva b/l, no ptosis, visual fields full\par Respiratory: no respiratory distress, normal effort, normal auscultation\par Cardiovascular: normal rate, rhythm, no murmurs\par Neck: supple, no masses\par Vascular: carotids normal\par Skin: normal color, no rashes\par Psych: normal mood, affect\par \par Neurological:\par Memory: oriented to person, place, time\par Language intact/no aphasia\par Cranial Nerves: II-XII intact, Pupils equally round and reactive to light, ocular muscles/movements intact, no ptosis, no facial weakness, tongue protrudes normally in the midline, \par Motor: normal tone, no pronator drift, full strength in all four extremities in the proximal and distal muscle groups. + bradykinesia\par Coordination: decreased amplitude of finger tap and rapid alt. movements, worse on left. + mild b/l cogwheeling on left > right. + minimal rest tremor noted on left\par Sensory: intact to light touch,\par DTRs: symmetric, 1+ in b/l triceps, 1+ in b/l biceps, 1+ in b/l brachioradialis, 1+ in bilateral patellars, trace in bilateral Achilles, Babinskis negative bilaterally\par Gait: short steps with significantly decreased arm swing.\par

## 2021-02-25 NOTE — HISTORY OF PRESENT ILLNESS
[FreeTextEntry1] : I last saw Ms. Bernstein on 12/15/20.\par She received her first dose of covid-19 vaccine (Pfizer) yesterday.\par \par She notices significant improvement in tremors. It is nearly completely gone on the right and occurs occasionally on the left.\par She is able to complete tasks that she could not before starting Sinemet.\par She is able to cut food and closer her bra.\par \par She had lightheadedness prior to starting Sinemet and this has not worsened.\par Otherwise she has no side effects.\par \par Sleep varies - some good nights, some bad nights.\par \par She had 1 episode of dream enactment behavior. She had a dream that she was running. She woke up out of bed. She found herself on the floor. She had no injuries. She sleeps with a big pillow next to the bed.\par She is taking melatonin 5 mg but sometimes falls asleep before taking it.\par She thinks that she may have missed the dose on the night that this event occurred.\par \par She has difficulty exercising due to knee pain (right side) and the weather.\par

## 2021-02-25 NOTE — DATA REVIEWED
[No studies available for review at this time.] : No studies available for review at this time. [de-identified] : MRI head no contrast 9/12/20:\par No acute infarct. Mild periventricular and subcortical white matter chronic microvascular ischemic changes

## 2021-02-25 NOTE — DISCUSSION/SUMMARY
[FreeTextEntry1] : Ms. Bernstein is an 85 year old woman with one year of tremors, the left hand more affected than the right hand.\par In addition to tremors, she has cogwheeling and decreased arm swing on exam as well as bradykinesia.\par She also has a history suggestive of REM Behavior Disorder.\par \par Parkinson's Disease\par -She is having benefit with Sinemet. 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 She denies snoring\par -Continue melatonin 5 mg since she believes that most recent event occurred after skipping dose. If events occur at this dose can increase dose to 8 mg or 10 mg.\par -Bedroom safety\par -She would like to hold off on sleep study. Will consider after she is fully vaccinated.\par \par f/u 4 months, sooner if needed.\par

## 2021-04-05 ENCOUNTER — APPOINTMENT (OUTPATIENT)
Dept: FAMILY MEDICINE | Facility: CLINIC | Age: 86
End: 2021-04-05
Payer: MEDICARE

## 2021-04-05 VITALS
HEIGHT: 67 IN | SYSTOLIC BLOOD PRESSURE: 129 MMHG | DIASTOLIC BLOOD PRESSURE: 75 MMHG | OXYGEN SATURATION: 97 % | WEIGHT: 142 LBS | BODY MASS INDEX: 22.29 KG/M2 | HEART RATE: 75 BPM | TEMPERATURE: 98 F

## 2021-04-05 PROCEDURE — 36415 COLL VENOUS BLD VENIPUNCTURE: CPT

## 2021-04-05 PROCEDURE — 99213 OFFICE O/P EST LOW 20 MIN: CPT | Mod: 25

## 2021-04-05 NOTE — HISTORY OF PRESENT ILLNESS
[Hyperlipidemia] : Hyperlipidemia [Hypertension] : Hypertension [FreeTextEntry6] : pt is here for bp check and blood work. [Does not check BP] : The patient is not checking blood pressure [<140/90] : Target blood pressure is <140/90 [Target goal met] : BP target goal met [Doing Well] : Patient is doing well [Managed with medications] : managed with  medication [Moderate Intensity Therapy] : Patient is currently on moderate intensity statin  therapy

## 2021-04-07 LAB
ALBUMIN SERPL ELPH-MCNC: 4.1 G/DL
ALP BLD-CCNC: 50 U/L
ALT SERPL-CCNC: 8 U/L
ANION GAP SERPL CALC-SCNC: 9 MMOL/L
AST SERPL-CCNC: 15 U/L
BILIRUB SERPL-MCNC: 1.2 MG/DL
BUN SERPL-MCNC: 18 MG/DL
CALCIUM SERPL-MCNC: 9.9 MG/DL
CHLORIDE SERPL-SCNC: 100 MMOL/L
CHOLEST SERPL-MCNC: 159 MG/DL
CO2 SERPL-SCNC: 30 MMOL/L
CREAT SERPL-MCNC: 0.79 MG/DL
GLUCOSE SERPL-MCNC: 106 MG/DL
HDLC SERPL-MCNC: 82 MG/DL
LDLC SERPL CALC-MCNC: 65 MG/DL
NONHDLC SERPL-MCNC: 78 MG/DL
POTASSIUM SERPL-SCNC: 4.2 MMOL/L
PROT SERPL-MCNC: 7.1 G/DL
SODIUM SERPL-SCNC: 139 MMOL/L
TRIGL SERPL-MCNC: 64 MG/DL

## 2021-04-26 ENCOUNTER — RX RENEWAL (OUTPATIENT)
Age: 86
End: 2021-04-26

## 2021-05-07 ENCOUNTER — RX RENEWAL (OUTPATIENT)
Age: 86
End: 2021-05-07

## 2021-06-21 ENCOUNTER — APPOINTMENT (OUTPATIENT)
Dept: NEUROLOGY | Facility: CLINIC | Age: 86
End: 2021-06-21
Payer: MEDICARE

## 2021-06-21 VITALS
DIASTOLIC BLOOD PRESSURE: 75 MMHG | SYSTOLIC BLOOD PRESSURE: 121 MMHG | WEIGHT: 140 LBS | HEIGHT: 67 IN | BODY MASS INDEX: 21.97 KG/M2 | HEART RATE: 70 BPM | TEMPERATURE: 97.2 F

## 2021-06-21 PROCEDURE — 99213 OFFICE O/P EST LOW 20 MIN: CPT

## 2021-06-21 NOTE — DATA REVIEWED
[de-identified] : MRI head no contrast 9/12/20:\par No acute infarct. Mild periventricular and subcortical white matter chronic microvascular ischemic changes

## 2021-06-21 NOTE — DISCUSSION/SUMMARY
[FreeTextEntry1] : Ms. Bernstein is an 85 year old woman with one year of tremors, the left hand more affected than the right hand.\par In addition to tremors on initial appointment, she has cogwheeling and decreased arm swing on exam as well as bradykinesia.\par She also has a history suggestive of REM Behavior Disorder.\par \par Parkinson's Disease\par -She is having benefit with Sinemet. Continue 25/100 - 1 tab TID\par -Stay well hydrated\par -Increase exercise. 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 She denies snoring\par -Continue melatonin 5 mg. If events occur at this dose can increase dose to 8 mg or 10 mg.\par -Bedroom safety\par -Again suggested sleep study. She is declining at this time.\par \par f/u 4-6 months, sooner if needed.\par

## 2021-06-21 NOTE — PHYSICAL EXAM
[FreeTextEntry1] : Examination:\par Constitutional: normal, no apparent distress\par Eyes: normal conjunctiva b/l, no ptosis, visual fields full\par Respiratory: no respiratory distress, normal effort, normal auscultation\par Cardiovascular: normal rate, rhythm, no murmurs\par Neck: supple, no masses\par Vascular: carotids normal\par Skin: normal color, no rashes\par Psych: normal mood, affect\par \par Neurological:\par Memory: oriented to person, place, time\par Language intact/no aphasia\par Cranial Nerves: II-XII intact, Pupils equally round and reactive to light, ocular muscles/movements intact, no ptosis, no facial weakness, tongue protrudes normally in the midline, \par Motor: normal tone, no pronator drift, full strength in all four extremities in the proximal and distal muscle groups. + bradykinesia\par Coordination: decreased amplitude of finger tap and rapid alt. movements, worse on left. + mild b/l. No cogwheeling, + rest tremor on left\par Sensory: intact to light touch,\par DTRs: symmetric, 1+ in b/l triceps, 1+ in b/l biceps, 1+ in b/l brachioradialis, 1+ in bilateral patellars, trace in bilateral Achilles, Babinskis negative bilaterally\par Gait: slightly short steps with decreased arm swing.

## 2021-06-21 NOTE — HISTORY OF PRESENT ILLNESS
[FreeTextEntry1] : Last visit 2/25/21.\par \par She states that symptoms remain mild.\par She has some mild tremors in her left hand.\par She states this is not interfering with her life.\par She is not having any problems with her right hand.\par \par She has no difficulties with walking for short periods of time.\par She gets tired after walking for long periods.\par She is very careful about her balance and has noted some loss of coordination.\par She has not had any recent falls.\par \par She denies difficulty with swallowing.\par \par She can fall asleep easily, but, her sleep is interrupted and once she wakes up she has difficulty falling back to sleep.\par She is not aware of any recent dream enactment.\par \par She takes Sinemet 25/100 at breakfast between 5-6 AM, at 11:30 AM and at 5-6 PM.\par She denies side effects.

## 2021-08-02 ENCOUNTER — APPOINTMENT (OUTPATIENT)
Dept: FAMILY MEDICINE | Facility: CLINIC | Age: 86
End: 2021-08-02
Payer: MEDICARE

## 2021-08-02 VITALS
SYSTOLIC BLOOD PRESSURE: 132 MMHG | TEMPERATURE: 98 F | OXYGEN SATURATION: 99 % | HEART RATE: 71 BPM | DIASTOLIC BLOOD PRESSURE: 76 MMHG | HEIGHT: 67 IN | BODY MASS INDEX: 21.97 KG/M2 | WEIGHT: 140 LBS

## 2021-08-02 PROCEDURE — 99214 OFFICE O/P EST MOD 30 MIN: CPT

## 2021-08-02 NOTE — HEALTH RISK ASSESSMENT
[Yes] : Yes [2 - 3 times a week (3 pts)] : 2 - 3  times a week (3 points) [1 or 2 (0 pts)] : 1 or 2 (0 points) [Never (0 pts)] : Never (0 points) [No] : In the past 12 months have you used drugs other than those required for medical reasons? No [No falls in past year] : Patient reported no falls in the past year [] : No [3] : 2) Feeling down, depressed, or hopeless for nearly every day (3) [Nearly Every Day (3)] : 4.) Feeling tired or having little energy? Nearly every day [Not at All (0)] : 7.) Trouble concentrating on things, such as reading a newspaper or watching television? Not at all [1/2 of Days or More (2)] : 8.) Moving or speaking so slowly that other people could have noticed, or the opposite, moving or speaking faster than usual? Half the days or more [Very Difficult] : How difficult have these problems made it for you to do your work, take care of things at home, or get along with people? Very difficult [PHQ-9 Positive] : PHQ-9 Positive [I have developed a follow-up plan documented below in the note.] : I have developed a follow-up plan documented below in the note. [Audit-CScore] : 3 [[id number]] : 2 [[id number]] : 14

## 2021-08-02 NOTE — HISTORY OF PRESENT ILLNESS
[Hypertension] : Hypertension [Does not check BP] : The patient is not checking blood pressure [<140/90] : Target blood pressure is <140/90 [Target goal met] : BP target goal met [FreeTextEntry6] : pt is here for bp check.

## 2021-08-02 NOTE — COUNSELING
[Fall prevention counseling provided] : Fall prevention counseling provided [Behavioral health counseling provided] : Behavioral health counseling provided [Engage in a relaxing activity] : Engage in a relaxing activity [Plan in advance] : Plan in advance [Potential consequences of obesity discussed] : Potential consequences of obesity discussed [Benefits of weight loss discussed] : Benefits of weight loss discussed

## 2021-08-30 ENCOUNTER — APPOINTMENT (OUTPATIENT)
Dept: FAMILY MEDICINE | Facility: CLINIC | Age: 86
End: 2021-08-30
Payer: MEDICARE

## 2021-08-30 VITALS
TEMPERATURE: 98 F | OXYGEN SATURATION: 99 % | HEART RATE: 73 BPM | DIASTOLIC BLOOD PRESSURE: 67 MMHG | SYSTOLIC BLOOD PRESSURE: 105 MMHG

## 2021-08-30 PROCEDURE — 90662 IIV NO PRSV INCREASED AG IM: CPT

## 2021-08-30 PROCEDURE — 99215 OFFICE O/P EST HI 40 MIN: CPT | Mod: 25

## 2021-08-30 PROCEDURE — G0008: CPT

## 2021-08-30 RX ORDER — ESCITALOPRAM OXALATE 10 MG/1
10 TABLET ORAL
Qty: 90 | Refills: 0 | Status: DISCONTINUED | COMMUNITY
Start: 2021-05-07

## 2021-08-30 NOTE — HISTORY OF PRESENT ILLNESS
[No Pertinent Cardiac History] : no history of aortic stenosis, atrial fibrillation, coronary artery disease, recent myocardial infarction, or implantable device/pacemaker [No Pertinent Pulmonary History] : no history of asthma, COPD, sleep apnea, or smoking [No Adverse Anesthesia Reaction] : no adverse anesthesia reaction in self or family member [(Patient denies any chest pain, claudication, dyspnea on exertion, orthopnea, palpitations or syncope)] : Patient denies any chest pain, claudication, dyspnea on exertion, orthopnea, palpitations or syncope [Moderate (4-6 METs)] : Moderate (4-6 METs) [Chronic Anticoagulation] : no chronic anticoagulation [Chronic Kidney Disease] : no chronic kidney disease [Diabetes] : no diabetes [FreeTextEntry1] :  tooth extraction [FreeTextEntry2] : 09/01/2021 [FreeTextEntry3] : Holly Byrne DDS

## 2021-08-30 NOTE — PLAN
[FreeTextEntry1] : The patient has been advised to remain NPO after the midright prior to surgery.\par Risks and benefits of the surgery have been discussed by the operative physician.\par The patient has been advised to avoid taking aspirin and aspirin containing products from now until surgery.\par The patient has been advised to take the carvedilol, carbidopa, and lisinopril HCT  on the morning of surgery with small sip of water.\par Thank you for including me in the care of your patient.\par

## 2021-08-30 NOTE — COUNSELING
[Weight management counseling provided] : Weight management [Healthy eating counseling provided] : healthy eating [Activity counseling provided] : activity [Behavioral health counseling provided] : behavioral health  [Fall prevention counseling provided] : fall prevention  [Engage in a relaxing activity] : Engage in a relaxing activity [Plan in advance] : Plan in advance

## 2021-09-13 ENCOUNTER — RX RENEWAL (OUTPATIENT)
Age: 86
End: 2021-09-13

## 2021-10-28 ENCOUNTER — RX RENEWAL (OUTPATIENT)
Age: 86
End: 2021-10-28

## 2021-10-29 ENCOUNTER — RX RENEWAL (OUTPATIENT)
Age: 86
End: 2021-10-29

## 2021-11-03 ENCOUNTER — NON-APPOINTMENT (OUTPATIENT)
Age: 86
End: 2021-11-03

## 2021-11-03 ENCOUNTER — APPOINTMENT (OUTPATIENT)
Dept: FAMILY MEDICINE | Facility: CLINIC | Age: 86
End: 2021-11-03
Payer: MEDICARE

## 2021-11-03 VITALS
WEIGHT: 144 LBS | TEMPERATURE: 97.2 F | HEIGHT: 64.5 IN | DIASTOLIC BLOOD PRESSURE: 75 MMHG | OXYGEN SATURATION: 99 % | SYSTOLIC BLOOD PRESSURE: 130 MMHG | HEART RATE: 77 BPM | BODY MASS INDEX: 24.29 KG/M2

## 2021-11-03 PROCEDURE — 93000 ELECTROCARDIOGRAM COMPLETE: CPT

## 2021-11-03 PROCEDURE — 99213 OFFICE O/P EST LOW 20 MIN: CPT | Mod: 25

## 2021-11-03 PROCEDURE — 81003 URINALYSIS AUTO W/O SCOPE: CPT | Mod: QW

## 2021-11-03 PROCEDURE — G0439: CPT

## 2021-11-03 PROCEDURE — 36415 COLL VENOUS BLD VENIPUNCTURE: CPT

## 2021-11-03 NOTE — HEALTH RISK ASSESSMENT
[Yes] : Yes [2 - 3 times a week (3 pts)] : 2 - 3  times a week (3 points) [1 or 2 (0 pts)] : 1 or 2 (0 points) [Never (0 pts)] : Never (0 points) [No] : In the past 12 months have you used drugs other than those required for medical reasons? No [Two or more falls in past year] : Patient reported two or more falls in the past year [0] : 1) Little interest or pleasure doing things: Not at all (0) [3] : 2) Feeling down, depressed, or hopeless for nearly every day (3) [Patient reported mammogram was normal] : Patient reported mammogram was normal [Patient reported bone density results were normal] : Patient reported bone density results were normal [HIV test declined] : HIV test declined [Hepatitis C test declined] : Hepatitis C test declined [With Family] : lives with family [# of Members in Household ___] :  household currently consist of [unfilled] member(s) [Retired] : retired [College] : College [] :  [# Of Children ___] : has [unfilled] children [Feels Safe at Home] : Feels safe at home [Fully functional (bathing, dressing, toileting, transferring, walking, feeding)] : Fully functional (bathing, dressing, toileting, transferring, walking, feeding) [Fully functional (using the telephone, shopping, preparing meals, housekeeping, doing laundry, using] : Fully functional and needs no help or supervision to perform IADLs (using the telephone, shopping, preparing meals, housekeeping, doing laundry, using transportation, managing medications and managing finances) [Reports changes in hearing] : Reports changes in hearing [Reports changes in vision] : Reports changes in vision [Smoke Detector] : smoke detector [Carbon Monoxide Detector] : carbon monoxide detector [Guns at Home] : guns at home [Safety elements used in home] : safety elements used in home [Seat Belt] :  uses seat belt [] : No [Audit-CScore] : 3 [[id number]] : 3 [Patient reported colonoscopy was normal] : Patient reported colonoscopy was normal [Change in mental status noted] : No change in mental status noted [Language] : denies difficulty with language [Behavior] : denies difficulty with behavior [Learning/Retaining New Information] : denies difficulty learning/retaining new information [Handling Complex Tasks] : denies difficulty handling complex tasks [Reasoning] : denies difficulty with reasoning [Spatial Ability and Orientation] : denies difficulty with spatial ability and orientation [None] : None [Sexually Active] : not sexually active [Reports normal functional visual acuity (ie: able to read med bottle)] : Reports poor functional visual acuity.  [Reports changes in dental health] : Reports no changes in dental health [Sunscreen] : does not use sunscreen [Travel to Developing Areas] : does not  travel to developing areas [TB Exposure] : is not being exposed to tuberculosis [Caregiver Concerns] : does not have caregiver concerns [MammogramDate] : 11/20 [PapSmearComments] : unsure [BoneDensityDate] : 01/17 [ColonoscopyDate] : 1/15 [AdvancecareDate] : 11/21

## 2021-11-03 NOTE — COUNSELING
[Fall prevention counseling provided] : Fall prevention counseling provided [Behavioral health counseling provided] : Behavioral health counseling provided [Potential consequences of obesity discussed] : Potential consequences of obesity discussed [Benefits of weight loss discussed] : Benefits of weight loss discussed [Good understanding] : Patient has a good understanding of disease, goals and obesity follow-up plan

## 2021-11-04 LAB
ALBUMIN SERPL ELPH-MCNC: 4.2 G/DL
ALP BLD-CCNC: 56 U/L
ALT SERPL-CCNC: 9 U/L
ANION GAP SERPL CALC-SCNC: 11 MMOL/L
AST SERPL-CCNC: 17 U/L
BASOPHILS # BLD AUTO: 0.05 K/UL
BASOPHILS NFR BLD AUTO: 1 %
BILIRUB SERPL-MCNC: 1.5 MG/DL
BUN SERPL-MCNC: 16 MG/DL
CALCIUM SERPL-MCNC: 9.5 MG/DL
CHLORIDE SERPL-SCNC: 97 MMOL/L
CHOLEST SERPL-MCNC: 154 MG/DL
CO2 SERPL-SCNC: 28 MMOL/L
CREAT SERPL-MCNC: 0.78 MG/DL
EOSINOPHIL # BLD AUTO: 0.14 K/UL
EOSINOPHIL NFR BLD AUTO: 2.8 %
GLUCOSE SERPL-MCNC: 104 MG/DL
HCT VFR BLD CALC: 42 %
HDLC SERPL-MCNC: 82 MG/DL
HGB BLD-MCNC: 13.6 G/DL
IMM GRANULOCYTES NFR BLD AUTO: 0.2 %
LDLC SERPL CALC-MCNC: 61 MG/DL
LYMPHOCYTES # BLD AUTO: 1.15 K/UL
LYMPHOCYTES NFR BLD AUTO: 23 %
MAN DIFF?: NORMAL
MCHC RBC-ENTMCNC: 32.2 PG
MCHC RBC-ENTMCNC: 32.4 GM/DL
MCV RBC AUTO: 99.3 FL
MONOCYTES # BLD AUTO: 0.42 K/UL
MONOCYTES NFR BLD AUTO: 8.4 %
NEUTROPHILS # BLD AUTO: 3.22 K/UL
NEUTROPHILS NFR BLD AUTO: 64.6 %
NONHDLC SERPL-MCNC: 72 MG/DL
PLATELET # BLD AUTO: 243 K/UL
POTASSIUM SERPL-SCNC: 4.4 MMOL/L
PROT SERPL-MCNC: 7 G/DL
RBC # BLD: 4.23 M/UL
RBC # FLD: 12.7 %
SODIUM SERPL-SCNC: 136 MMOL/L
T3FREE SERPL-MCNC: 2.55 PG/ML
T4 FREE SERPL-MCNC: 1.4 NG/DL
TRIGL SERPL-MCNC: 57 MG/DL
TSH SERPL-ACNC: 1.6 UIU/ML
WBC # FLD AUTO: 4.99 K/UL

## 2021-11-22 ENCOUNTER — APPOINTMENT (OUTPATIENT)
Dept: NEUROLOGY | Facility: CLINIC | Age: 86
End: 2021-11-22
Payer: MEDICARE

## 2021-11-22 VITALS
SYSTOLIC BLOOD PRESSURE: 148 MMHG | BODY MASS INDEX: 24.29 KG/M2 | WEIGHT: 144 LBS | DIASTOLIC BLOOD PRESSURE: 84 MMHG | HEIGHT: 64.5 IN | HEART RATE: 71 BPM | TEMPERATURE: 97.8 F

## 2021-11-22 DIAGNOSIS — G47.00 INSOMNIA, UNSPECIFIED: ICD-10-CM

## 2021-11-22 PROCEDURE — 99213 OFFICE O/P EST LOW 20 MIN: CPT

## 2021-11-22 NOTE — HISTORY OF PRESENT ILLNESS
[FreeTextEntry1] : Last visit 6/21/21.\par She has noticed slight worsening of her tremor but does not feel that it is interfering with her functioning. She notices it more when she is tired or anxious.\par She has fallen twice in her kitchen, both times when she was trying to do something too quickly and bending the wrong way.\par Her right knee also bothers her.\par She denies difficulty swallowing.\par Sleep has been problematic for a long time. She can fall asleep easily but wakes up a couple of hours later and it takes hours to fall back to sleep.\par She denies having any dream enactment behavior (this did occur years ago).\par \par She denies side effects to carbidopa-levodopa including dizziness/lightheadedness.\par She typically takes the medication at 6 AM, 11 Am and 5 PM.\par There are rare occasions when she feels as if she needs to take her medication earlier.\par \par She has stopped driving.

## 2021-11-22 NOTE — PHYSICAL EXAM
[FreeTextEntry1] : Medication taken at 6 AM\par Time of examination 10:15 AM\par \par Examination:\par Constitutional: normal, no apparent distress\par Eyes: normal conjunctiva b/l, no ptosis, visual fields full\par Respiratory: no respiratory distress, normal effort, normal auscultation\par Cardiovascular: normal rate, rhythm, no murmurs\par Neck: supple, no masses\par Vascular: carotids normal\par Skin: normal color, no rashes\par Psych: normal mood, affect\par \par Neurological:\par Memory: oriented to person, place, time\par Language intact/no aphasia\par Cranial Nerves: II-XII intact, Pupils equally round and reactive to light, ocular muscles/movements intact, no ptosis, no facial weakness, tongue protrudes normally in the midline, \par Motor: normal tone, no pronator drift, full strength in all four extremities in the proximal and distal muscle groups. + bradykinesia\par Coordination: decreased amplitude of finger tap and rapid alt. movements, worse on left. + mild b/l. No cogwheeling, no rest tremor noted on exam\par Sensory: intact to light touch,\par DTRs: symmetric, 1+ in b/l triceps, 1+ in b/l biceps, 1+ in b/l brachioradialis, 1+ in bilateral patellars, trace in bilateral Achilles, Babinskis negative bilaterally\par Gait: slightly short steps with decreased arm swing.

## 2021-11-22 NOTE — DISCUSSION/SUMMARY
[FreeTextEntry1] : Ms. Bernstein is an 86 year old woman with one year of tremors, the left hand more affected than the right hand.\par In addition to tremors on initial appointment, she has cogwheeling and decreased arm swing on exam as well as bradykinesia.\par She also has a history suggestive of REM Behavior Disorder.\par \par Parkinson's Disease\par -She is having benefit with Sinemet. Continue 25/100 - 1 tab TID. We discussed that she can add a fourth dose of needed. She would like to continue with three pills per day and will adjust the timing as needed.\par -Stay well hydrated\par -Increase exercise. \par \par REM Behavior Disorder\par -Likely secondary to PD, no clear signs of AALIYAH. She denies snoring\par -Continue melatonin 5 mg. She often forgets to take it. If events occur at this dose can increase dose to 8 mg or 10 mg.\par -Bedroom safety\par -Again suggested sleep study. She is declining at this time.\par \par Discussed sleep hygiene for insomnia including scheduled thinking/worrying time.\par \par f/u 4-6 months, sooner if needed.

## 2021-11-22 NOTE — DATA REVIEWED
[No studies available for review at this time.] : No studies available for review at this time. [de-identified] : MRI head no contrast 9/12/20:\par No acute infarct. Mild periventricular and subcortical white matter chronic microvascular ischemic changes

## 2021-12-13 ENCOUNTER — RX RENEWAL (OUTPATIENT)
Age: 86
End: 2021-12-13

## 2022-01-28 ENCOUNTER — RX RENEWAL (OUTPATIENT)
Age: 87
End: 2022-01-28

## 2022-01-30 ENCOUNTER — RX RENEWAL (OUTPATIENT)
Age: 87
End: 2022-01-30

## 2022-03-27 ENCOUNTER — RX RENEWAL (OUTPATIENT)
Age: 87
End: 2022-03-27

## 2022-04-17 ENCOUNTER — RX RENEWAL (OUTPATIENT)
Age: 87
End: 2022-04-17

## 2022-04-27 ENCOUNTER — RX RENEWAL (OUTPATIENT)
Age: 87
End: 2022-04-27

## 2022-04-29 ENCOUNTER — APPOINTMENT (OUTPATIENT)
Dept: NEUROLOGY | Facility: CLINIC | Age: 87
End: 2022-04-29
Payer: MEDICARE

## 2022-04-29 VITALS
HEIGHT: 64.5 IN | WEIGHT: 148 LBS | DIASTOLIC BLOOD PRESSURE: 72 MMHG | BODY MASS INDEX: 24.96 KG/M2 | HEART RATE: 76 BPM | SYSTOLIC BLOOD PRESSURE: 109 MMHG | TEMPERATURE: 97.8 F

## 2022-04-29 PROCEDURE — 99213 OFFICE O/P EST LOW 20 MIN: CPT

## 2022-04-29 NOTE — DATA REVIEWED
[No studies available for review at this time.] : No studies available for review at this time. [de-identified] : MRI head no contrast 9/12/20:\par No acute infarct. Mild periventricular and subcortical white matter chronic microvascular ischemic changes

## 2022-04-29 NOTE — DISCUSSION/SUMMARY
[FreeTextEntry1] : Ms. Bernstein is an 86 year old woman with one year of tremors, the left hand more affected than the right hand.\par In addition to tremors on initial appointment, she has cogwheeling and decreased arm swing on exam as well as bradykinesia.\par She also has a history suggestive of REM Behavior Disorder.\par \par Parkinson's Disease\par -She is having benefit with Sinemet. Continue 25/100 - 1 tab TID. \par - She reports some mild progression of symptoms but rather not increase medication at this time. \par We discussed that she can add a fourth dose if needed. \par -Stay well hydrated\par -Daily exercise\par \par REM Behavior Disorder\par -Rare events, but did occur recently\par -Restart melatonin 5 mg at bedtime. Discussed strategies for compliance with melatonin.\par -Safe bedroom environment.\par \par Depression\par -Currently taking escitalopram 20 mg and reports continued depressed mood, states that she has a seasonal component and feels better when the weather is better.\par -If this persists through the warmer weather, consider medication change.\par \par f/u 4-6 months, sooner if needed. \par

## 2022-04-29 NOTE — PHYSICAL EXAM
[FreeTextEntry1] : Examination:\par Constitutional: normal, no apparent distress\par Eyes: normal conjunctiva b/l, no ptosis, visual fields full\par Respiratory: no respiratory distress, normal effort, normal auscultation\par Cardiovascular: normal rate, rhythm, no murmurs\par Neck: supple, no masses\par Vascular: carotids normal\par Skin: normal color, no rashes\par Psych: normal mood, affect\par \par Neurological:\par Memory: oriented to person, place, time\par Language intact/no aphasia\par Cranial Nerves: II-XII intact, Pupils equally round and reactive to light, ocular muscles/movements intact, no ptosis, no facial weakness, tongue protrudes normally in the midline, \par Motor: normal tone, no pronator drift, full strength in all four extremities in the proximal and distal muscle groups. + bradykinesia\par Coordination: decreased amplitude of finger tap and rapid alt. movements, worse on left. + mild b/l. No cogwheeling, mild rest tremor noted on left. \par Sensory: intact to light touch,\par DTRs: symmetric, 1+ in b/l triceps, 1+ in b/l biceps, 1+ in b/l brachioradialis, 1 left patellar, trace on right.\par Gait: slightly short steps with decreased arm swing. \par

## 2022-04-29 NOTE — HISTORY OF PRESENT ILLNESS
[FreeTextEntry1] : Last visit 11/22/21.\par \par She denies having any significant progression in symptoms.\par She does notice tremors at times.\par She denies dizziness.\par There have not been any falls. \par Sometimes she does have a globus sensation but no significant difficulty with swallowing. \par \par She denies adverse effects with medication. \par She takes Sinemet 25/100 when she wakes up, 5-5.5 hours later and then again at dinner time. \par \par A few nights ago she had a dream that she was being chased. She woke up screaming and sitting at the edge of the bed. She has not been taking melatonin. She tends to forget (she falls asleep before she takes it). \par She does not think that she had events in the past when she was taking 5 mg of melatonin but it happens very in frequently.\par \par She does feel depressed at times. It tends to be worse in the winter.\par She is taking escitalopram 20 mg.

## 2022-05-02 ENCOUNTER — RX RENEWAL (OUTPATIENT)
Age: 87
End: 2022-05-02

## 2022-05-13 ENCOUNTER — APPOINTMENT (OUTPATIENT)
Dept: FAMILY MEDICINE | Facility: CLINIC | Age: 87
End: 2022-05-13
Payer: MEDICARE

## 2022-05-13 VITALS
WEIGHT: 148 LBS | BODY MASS INDEX: 24.96 KG/M2 | HEIGHT: 64.5 IN | TEMPERATURE: 97.9 F | DIASTOLIC BLOOD PRESSURE: 80 MMHG | OXYGEN SATURATION: 100 % | SYSTOLIC BLOOD PRESSURE: 138 MMHG | HEART RATE: 67 BPM

## 2022-05-13 LAB
BILIRUB UR QL STRIP: NORMAL
GLUCOSE UR-MCNC: NORMAL
HCG UR QL: 0.2 EU/DL
HGB UR QL STRIP.AUTO: NORMAL
KETONES UR-MCNC: NORMAL
LEUKOCYTE ESTERASE UR QL STRIP: ABNORMAL
NITRITE UR QL STRIP: NORMAL
PH UR STRIP: 7
PROT UR STRIP-MCNC: NORMAL
SP GR UR STRIP: 1.01

## 2022-05-13 PROCEDURE — 81003 URINALYSIS AUTO W/O SCOPE: CPT | Mod: QW

## 2022-05-13 PROCEDURE — 99214 OFFICE O/P EST MOD 30 MIN: CPT | Mod: 25

## 2022-05-13 PROCEDURE — 36415 COLL VENOUS BLD VENIPUNCTURE: CPT

## 2022-05-13 NOTE — PLAN
[FreeTextEntry1] : Continue present dose of the coreg, lexapro, lisinopril and zocor\par Await results of BW as above

## 2022-05-13 NOTE — HISTORY OF PRESENT ILLNESS
[Hypertension] : Hypertension [FreeTextEntry6] : pt is here for fasting blood work and bp check. [Checks BP Regularly] : The patient checks ~his/her~ blood pressure regularly [<140/90] : Target blood pressure is <140/90 [Target goal met] : BP target goal met

## 2022-05-15 LAB
ALBUMIN SERPL ELPH-MCNC: 4.5 G/DL
ALP BLD-CCNC: 53 U/L
ALT SERPL-CCNC: 15 U/L
ANION GAP SERPL CALC-SCNC: 11 MMOL/L
AST SERPL-CCNC: 24 U/L
BILIRUB SERPL-MCNC: 1.7 MG/DL
BUN SERPL-MCNC: 19 MG/DL
CALCIUM SERPL-MCNC: 9.7 MG/DL
CHLORIDE SERPL-SCNC: 97 MMOL/L
CHOLEST SERPL-MCNC: 167 MG/DL
CO2 SERPL-SCNC: 28 MMOL/L
CREAT SERPL-MCNC: 0.8 MG/DL
EGFR: 72 ML/MIN/1.73M2
GLUCOSE SERPL-MCNC: 103 MG/DL
HDLC SERPL-MCNC: 83 MG/DL
LDLC SERPL CALC-MCNC: 72 MG/DL
NONHDLC SERPL-MCNC: 84 MG/DL
POTASSIUM SERPL-SCNC: 4.6 MMOL/L
PROT SERPL-MCNC: 7.4 G/DL
SODIUM SERPL-SCNC: 137 MMOL/L
TRIGL SERPL-MCNC: 59 MG/DL

## 2022-07-24 ENCOUNTER — RX RENEWAL (OUTPATIENT)
Age: 87
End: 2022-07-24

## 2022-07-28 ENCOUNTER — RX RENEWAL (OUTPATIENT)
Age: 87
End: 2022-07-28

## 2022-08-11 ENCOUNTER — APPOINTMENT (OUTPATIENT)
Dept: FAMILY MEDICINE | Facility: CLINIC | Age: 87
End: 2022-08-11

## 2022-08-11 VITALS
TEMPERATURE: 98 F | HEART RATE: 75 BPM | OXYGEN SATURATION: 98 % | SYSTOLIC BLOOD PRESSURE: 118 MMHG | DIASTOLIC BLOOD PRESSURE: 72 MMHG

## 2022-08-11 PROCEDURE — 99214 OFFICE O/P EST MOD 30 MIN: CPT

## 2022-08-11 NOTE — HISTORY OF PRESENT ILLNESS
[Hypertension] : Hypertension [FreeTextEntry6] : pt is here for bp check. [Checks BP Sporadically] : The patient checks ~his/her~ blood pressure sporadically [<140/90] : Target blood pressure is <140/90 [Target goal met] : BP target goal met

## 2022-08-11 NOTE — PLAN
[FreeTextEntry1] : Continue present doses of BP and lipid drugs\par Reviewed results of lipid tests.

## 2022-09-09 ENCOUNTER — APPOINTMENT (OUTPATIENT)
Dept: FAMILY MEDICINE | Facility: CLINIC | Age: 87
End: 2022-09-09

## 2022-09-21 ENCOUNTER — RX RENEWAL (OUTPATIENT)
Age: 87
End: 2022-09-21

## 2022-10-12 ENCOUNTER — RX RENEWAL (OUTPATIENT)
Age: 87
End: 2022-10-12

## 2022-10-23 ENCOUNTER — RX RENEWAL (OUTPATIENT)
Age: 87
End: 2022-10-23

## 2022-10-25 ENCOUNTER — RX RENEWAL (OUTPATIENT)
Age: 87
End: 2022-10-25

## 2022-10-27 ENCOUNTER — APPOINTMENT (OUTPATIENT)
Dept: NEUROLOGY | Facility: CLINIC | Age: 87
End: 2022-10-27

## 2022-10-27 ENCOUNTER — NON-APPOINTMENT (OUTPATIENT)
Age: 87
End: 2022-10-27

## 2022-10-27 VITALS
WEIGHT: 148 LBS | BODY MASS INDEX: 24.96 KG/M2 | HEIGHT: 64.5 IN | HEART RATE: 71 BPM | DIASTOLIC BLOOD PRESSURE: 84 MMHG | TEMPERATURE: 97.8 F | SYSTOLIC BLOOD PRESSURE: 148 MMHG

## 2022-10-27 PROCEDURE — 99213 OFFICE O/P EST LOW 20 MIN: CPT

## 2022-10-27 NOTE — PHYSICAL EXAM
[FreeTextEntry1] : \par Examination:\par Constitutional: normal, no apparent distress\par Eyes: normal conjunctiva b/l, no ptosis, visual fields full\par Respiratory: no respiratory distress, normal effort, normal auscultation\par Cardiovascular: normal rate, rhythm, no murmurs\par Neck: supple, no masses\par Vascular: carotids normal\par Skin: normal color, no rashes\par Psych: normal mood, affect\par \par Neurological:\par Memory: oriented to person, place, time\par Language intact/no aphasia\par Cranial Nerves: II-XII intact, Pupils equally round and reactive to light, ocular muscles/movements intact, no ptosis, no facial weakness, tongue protrudes normally in the midline, \par Motor: normal tone, no pronator drift, full strength in all four extremities in the proximal and distal muscle groups. + bradykinesia\par Coordination: decreased amplitude of finger tap and rapid alt. movements, worse on left. + mild b/l. No cogwheeling, mild rest tremor noted on left. \par Sensory: intact to light touch,\par DTRs: symmetric, 1+ in b/l triceps, 1+ in b/l biceps, 1+ in b/l brachioradialis, 1 left patellar, trace on right.\par Gait: slight decreased arm swing. Slight unsteadiness when turning\par

## 2022-10-27 NOTE — DATA REVIEWED
[No studies available for review at this time.] : No studies available for review at this time. [de-identified] : MRI head no contrast 9/12/20:\par No acute infarct. Mild periventricular and subcortical white matter chronic microvascular ischemic changes

## 2022-10-27 NOTE — DISCUSSION/SUMMARY
[FreeTextEntry1] : Ms. Bernstein is an 87 year old woman with one year of tremors, the left hand more affected than the right hand.\par In addition to tremors on initial appointment, she has cogwheeling and decreased arm swing on exam as well as bradykinesia.\par She also has a history suggestive of REM Behavior Disorder.\par \par Parkinson's Disease\par -She is having benefit with Sinemet but sometimes has some worsening of fine motor control later in the day. This may be occurring as her midday dose is wearing off. \par  Continue 25/100 - 1 tab TID for now. If the above is becoming more of a problem, can add a 4th dose or take 1.5 tablets with her 2nd dose.\par -Stay well hydrated\par -Daily exercise\par \par REM Behavior Disorder\par -Rare events, but none recently.\par -Continue melatonin 5 mg hs\par -Safe bedroom environment.\par \par Depression\par -Currently taking escitalopram 20 mg.\par -Has noted a seasonal component to depression. If not improving will discuss with Dr. Polanco at next visit.\par \par \par f/u 6 months, sooner if needed. \par

## 2022-12-02 ENCOUNTER — RX RENEWAL (OUTPATIENT)
Age: 87
End: 2022-12-02

## 2023-01-20 ENCOUNTER — RX RENEWAL (OUTPATIENT)
Age: 88
End: 2023-01-20

## 2023-02-14 ENCOUNTER — APPOINTMENT (OUTPATIENT)
Dept: FAMILY MEDICINE | Facility: CLINIC | Age: 88
End: 2023-02-14
Payer: MEDICARE

## 2023-02-14 VITALS
HEIGHT: 65 IN | SYSTOLIC BLOOD PRESSURE: 138 MMHG | BODY MASS INDEX: 24.66 KG/M2 | OXYGEN SATURATION: 94 % | TEMPERATURE: 98.4 F | WEIGHT: 148 LBS | HEART RATE: 56 BPM | DIASTOLIC BLOOD PRESSURE: 84 MMHG

## 2023-02-14 DIAGNOSIS — J30.89 OTHER ALLERGIC RHINITIS: ICD-10-CM

## 2023-02-14 DIAGNOSIS — G47.52 REM SLEEP BEHAVIOR DISORDER: ICD-10-CM

## 2023-02-14 DIAGNOSIS — Z01.810 ENCOUNTER FOR PREPROCEDURAL CARDIOVASCULAR EXAMINATION: ICD-10-CM

## 2023-02-14 DIAGNOSIS — Z86.69 PERSONAL HISTORY OF OTHER DISEASES OF THE NERVOUS SYSTEM AND SENSE ORGANS: ICD-10-CM

## 2023-02-14 PROCEDURE — 99214 OFFICE O/P EST MOD 30 MIN: CPT

## 2023-02-14 NOTE — HISTORY OF PRESENT ILLNESS
[Hypertension] : Hypertension [FreeTextEntry6] : pt is here for bp check [Does not check BP] : The patient is not checking blood pressure [<140/90] : Target blood pressure is <140/90 [Target goal met] : BP target goal met

## 2023-02-14 NOTE — PLAN
[FreeTextEntry1] : Schedule CPE\par Continue carvedilol and lisinopril\par Continue simvastatin\par Schedule CPE with fasting BW

## 2023-03-23 ENCOUNTER — EMERGENCY (EMERGENCY)
Facility: HOSPITAL | Age: 88
LOS: 0 days | Discharge: HOME | End: 2023-03-24
Attending: EMERGENCY MEDICINE
Payer: MEDICARE

## 2023-03-23 VITALS
DIASTOLIC BLOOD PRESSURE: 96 MMHG | RESPIRATION RATE: 18 BRPM | TEMPERATURE: 98 F | OXYGEN SATURATION: 95 % | HEART RATE: 103 BPM | SYSTOLIC BLOOD PRESSURE: 111 MMHG

## 2023-03-23 VITALS — HEIGHT: 66 IN | WEIGHT: 149.91 LBS

## 2023-03-23 DIAGNOSIS — R10.11 RIGHT UPPER QUADRANT PAIN: ICD-10-CM

## 2023-03-23 DIAGNOSIS — I10 ESSENTIAL (PRIMARY) HYPERTENSION: ICD-10-CM

## 2023-03-23 DIAGNOSIS — R73.9 HYPERGLYCEMIA, UNSPECIFIED: ICD-10-CM

## 2023-03-23 DIAGNOSIS — K80.20 CALCULUS OF GALLBLADDER WITHOUT CHOLECYSTITIS WITHOUT OBSTRUCTION: ICD-10-CM

## 2023-03-23 DIAGNOSIS — R11.2 NAUSEA WITH VOMITING, UNSPECIFIED: ICD-10-CM

## 2023-03-23 DIAGNOSIS — E78.5 HYPERLIPIDEMIA, UNSPECIFIED: ICD-10-CM

## 2023-03-23 DIAGNOSIS — G20 PARKINSON'S DISEASE: ICD-10-CM

## 2023-03-23 DIAGNOSIS — Z20.822 CONTACT WITH AND (SUSPECTED) EXPOSURE TO COVID-19: ICD-10-CM

## 2023-03-23 LAB
ABO RH CONFIRMATION: SIGNIFICANT CHANGE UP
BLD GP AB SCN SERPL QL: SIGNIFICANT CHANGE UP
FLUAV AG NPH QL: SIGNIFICANT CHANGE UP
FLUBV AG NPH QL: SIGNIFICANT CHANGE UP
LACTATE SERPL-SCNC: 1.4 MMOL/L — SIGNIFICANT CHANGE UP (ref 0.7–2)
RSV RNA NPH QL NAA+NON-PROBE: SIGNIFICANT CHANGE UP
SARS-COV-2 RNA SPEC QL NAA+PROBE: SIGNIFICANT CHANGE UP

## 2023-03-23 PROCEDURE — 83605 ASSAY OF LACTIC ACID: CPT

## 2023-03-23 PROCEDURE — 99284 EMERGENCY DEPT VISIT MOD MDM: CPT | Mod: 25

## 2023-03-23 PROCEDURE — 80053 COMPREHEN METABOLIC PANEL: CPT

## 2023-03-23 PROCEDURE — 99285 EMERGENCY DEPT VISIT HI MDM: CPT

## 2023-03-23 PROCEDURE — 85730 THROMBOPLASTIN TIME PARTIAL: CPT

## 2023-03-23 PROCEDURE — 83690 ASSAY OF LIPASE: CPT

## 2023-03-23 PROCEDURE — 76705 ECHO EXAM OF ABDOMEN: CPT | Mod: 26

## 2023-03-23 PROCEDURE — 76705 ECHO EXAM OF ABDOMEN: CPT

## 2023-03-23 PROCEDURE — 86900 BLOOD TYPING SEROLOGIC ABO: CPT

## 2023-03-23 PROCEDURE — 0241U: CPT

## 2023-03-23 PROCEDURE — 96374 THER/PROPH/DIAG INJ IV PUSH: CPT

## 2023-03-23 PROCEDURE — 85025 COMPLETE CBC W/AUTO DIFF WBC: CPT

## 2023-03-23 PROCEDURE — 85610 PROTHROMBIN TIME: CPT

## 2023-03-23 PROCEDURE — 86850 RBC ANTIBODY SCREEN: CPT

## 2023-03-23 PROCEDURE — 36415 COLL VENOUS BLD VENIPUNCTURE: CPT

## 2023-03-23 PROCEDURE — 86901 BLOOD TYPING SEROLOGIC RH(D): CPT

## 2023-03-23 PROCEDURE — 87040 BLOOD CULTURE FOR BACTERIA: CPT

## 2023-03-23 RX ORDER — ONDANSETRON 8 MG/1
4 TABLET, FILM COATED ORAL ONCE
Refills: 0 | Status: COMPLETED | OUTPATIENT
Start: 2023-03-23 | End: 2023-03-23

## 2023-03-23 RX ORDER — SODIUM CHLORIDE 9 MG/ML
1000 INJECTION INTRAMUSCULAR; INTRAVENOUS; SUBCUTANEOUS ONCE
Refills: 0 | Status: COMPLETED | OUTPATIENT
Start: 2023-03-23 | End: 2023-03-23

## 2023-03-23 RX ORDER — MORPHINE SULFATE 50 MG/1
4 CAPSULE, EXTENDED RELEASE ORAL ONCE
Refills: 0 | Status: DISCONTINUED | OUTPATIENT
Start: 2023-03-23 | End: 2023-03-23

## 2023-03-23 RX ADMIN — ONDANSETRON 4 MILLIGRAM(S): 8 TABLET, FILM COATED ORAL at 21:43

## 2023-03-23 RX ADMIN — SODIUM CHLORIDE 1000 MILLILITER(S): 9 INJECTION INTRAMUSCULAR; INTRAVENOUS; SUBCUTANEOUS at 21:43

## 2023-03-23 NOTE — ED PROVIDER NOTE - CARE PROVIDER_API CALL
Cali Polanco)  Family Medicine  [address], [address], [place] [address]  Phone: [phone]  Fax: [phone]  Follow Up Time: Urgent    Jett Lopez  SURGERY  [address] [place] [address]  Phone: [phone]  Fax: [phone]  Follow Up Time: 1-3 Days

## 2023-03-23 NOTE — ED PROVIDER NOTE - OBJECTIVE STATEMENT
87 y/o female with a PMHx of HTN, Parkinson's, HLD presents ambulatory to the ED with son c/o intermittent RUQ abdominal pain; +nausea and +emesis starting today earlier this afternoon. Pt denies cough, denies fevers.

## 2023-03-23 NOTE — ED PROVIDER NOTE - PROGRESS NOTE DETAILS
pt s/o to oncoming ed doc, dr jenkins pt feeling better tolerated water and salting, denies pain. abd soft nontender no rebound or guarding.  pt and son told her blood sugar is high she states she ate m&ms this afternoon which is when the pain started, told to f/u with pcp dr cristina sims    ED evaluation and management discussed with the patient and family (if available) in detail.  Close PMD follow up encouraged.  Strict ED return instructions discussed in detail and patient given the opportunity to ask any questions about their discharge diagnosis and instructions. Patient verbalized understanding.

## 2023-03-23 NOTE — ED PROVIDER NOTE - PHYSICAL EXAMINATION
Constitutional: Elderly female laying in bed NAD  Eyes: PERRLA EOMI  Head: Normocephalic atraumatic  Mouth: MMM  Cardiac: regular rate   Resp: Lungs CTAB  GI: RUQ TTP   Neuro: awake, alert, moving all extremities, cranial nerves 2-12 intact, sensation intact, no dysmetria.  Skin: No rashes, no evidence of shingles.

## 2023-03-23 NOTE — ED PROVIDER NOTE - NS ED ROS FT
Constitutional: No fever or chills  Eyes: No visual changes  HEENT: No throat pain  CV: No chest pain  Resp: No SOB no cough  GI: +abd pain, +nausea, +vomiting  : No dysuria  MSK: No musculoskeletal pain  Skin: No rash  Neuro: No headache

## 2023-03-23 NOTE — ED PROVIDER NOTE - PROVIDER TOKENS
PROVIDER:[TOKEN:[[id number]],FOLLOWUP:[Urgent]],PROVIDER:[TOKEN:[[id number]],FOLLOWUP:[1-3 Days]]

## 2023-03-23 NOTE — ED PROVIDER NOTE - PATIENT PORTAL LINK FT
You can access the FollowMyHealth Patient Portal offered by [facility] by registering at the following website: [email or web address]. By joining [facility]’s FollowMyHealth portal, you will also be able to view your health information using other applications (apps) compatible with our system.

## 2023-03-23 NOTE — ED ADULT TRIAGE NOTE - CHIEF COMPLAINT QUOTE
Ambulatory to ER with c/o RUQ abdominal pain; emesis starting today. Patient denies fever/diarrhea, no medication taken prior to arrival.

## 2023-03-23 NOTE — ED PROVIDER NOTE - CLINICAL SUMMARY MEDICAL DECISION MAKING FREE TEXT BOX
87 y/o female with a PMHx of HTN, Parkinson's, HLD presents to the ED c/o RUQ pain will obtain EKG, US, blood work and will reassess closely.

## 2023-03-24 NOTE — ED ADULT NURSE NOTE - IN THE PAST 12 MONTHS HAVE YOU USED DRUGS OTHER THAN THOSE REQUIRED FOR MEDICAL REASON?
No Cyclosporine Pregnancy And Lactation Text: This medication is Pregnancy Category C and it isn't know if it is safe during pregnancy. This medication is excreted in breast milk.

## 2023-03-25 ENCOUNTER — RX RENEWAL (OUTPATIENT)
Age: 88
End: 2023-03-25

## 2023-03-29 LAB
CULTURE RESULTS: SIGNIFICANT CHANGE UP
CULTURE RESULTS: SIGNIFICANT CHANGE UP
SPECIMEN SOURCE: SIGNIFICANT CHANGE UP
SPECIMEN SOURCE: SIGNIFICANT CHANGE UP

## 2023-04-07 ENCOUNTER — NON-APPOINTMENT (OUTPATIENT)
Age: 88
End: 2023-04-07

## 2023-04-07 ENCOUNTER — APPOINTMENT (OUTPATIENT)
Dept: FAMILY MEDICINE | Facility: CLINIC | Age: 88
End: 2023-04-07
Payer: MEDICARE

## 2023-04-07 VITALS
BODY MASS INDEX: 24.66 KG/M2 | HEART RATE: 91 BPM | DIASTOLIC BLOOD PRESSURE: 60 MMHG | WEIGHT: 148 LBS | HEIGHT: 65 IN | OXYGEN SATURATION: 93 % | SYSTOLIC BLOOD PRESSURE: 80 MMHG | TEMPERATURE: 97.7 F

## 2023-04-07 DIAGNOSIS — Z00.00 ENCOUNTER FOR GENERAL ADULT MEDICAL EXAMINATION W/OUT ABNORMAL FINDINGS: ICD-10-CM

## 2023-04-07 LAB
BILIRUB UR QL STRIP: NORMAL
GLUCOSE UR-MCNC: NORMAL
HCG UR QL: 1 EU/DL
HGB UR QL STRIP.AUTO: ABNORMAL
KETONES UR-MCNC: ABNORMAL
LEUKOCYTE ESTERASE UR QL STRIP: ABNORMAL
NITRITE UR QL STRIP: NORMAL
PH UR STRIP: 6
PROT UR STRIP-MCNC: NORMAL
SP GR UR STRIP: 1.02

## 2023-04-07 PROCEDURE — 81003 URINALYSIS AUTO W/O SCOPE: CPT | Mod: QW

## 2023-04-07 PROCEDURE — 93000 ELECTROCARDIOGRAM COMPLETE: CPT

## 2023-04-07 PROCEDURE — G0439: CPT

## 2023-04-07 PROCEDURE — 36415 COLL VENOUS BLD VENIPUNCTURE: CPT

## 2023-04-07 PROCEDURE — 99213 OFFICE O/P EST LOW 20 MIN: CPT | Mod: 25

## 2023-04-07 NOTE — HEALTH RISK ASSESSMENT
[Yes] : Yes [Monthly or less (1 pt)] : Monthly or less (1 point) [1 or 2 (0 pts)] : 1 or 2 (0 points) [Never (0 pts)] : Never (0 points) [No] : In the past 12 months have you used drugs other than those required for medical reasons? No [0] : 1) Little interest or pleasure doing things: Not at all (0) [3] : 2) Feeling down, depressed, or hopeless for nearly every day (3) [PHQ-2 Positive] : PHQ-2 Positive [HIV test declined] : HIV test declined [Hepatitis C test declined] : Hepatitis C test declined [With Family] : lives with family [# of Members in Household ___] :  household currently consist of [unfilled] member(s) [Retired] : retired [College] : College [] :  [# Of Children ___] : has [unfilled] children [Feels Safe at Home] : Feels safe at home [Fully functional (bathing, dressing, toileting, transferring, walking, feeding)] : Fully functional (bathing, dressing, toileting, transferring, walking, feeding) [Fully functional (using the telephone, shopping, preparing meals, housekeeping, doing laundry, using] : Fully functional and needs no help or supervision to perform IADLs (using the telephone, shopping, preparing meals, housekeeping, doing laundry, using transportation, managing medications and managing finances) [Reports changes in hearing] : Reports changes in hearing [Reports changes in vision] : Reports changes in vision [Reports normal functional visual acuity (ie: able to read med bottle)] : Reports normal functional visual acuity [Reports changes in dental health] : Reports changes in dental health [Smoke Detector] : smoke detector [Carbon Monoxide Detector] : carbon monoxide detector [Guns at Home] : guns at home [Safety elements used in home] : safety elements used in home [Seat Belt] :  uses seat belt [Two or more falls in past year] : Patient reported two or more falls in the past year [Audit-CScore] : 1 [[id number]] : 3 [Patient reported mammogram was normal] : Patient reported mammogram was normal [Change in mental status noted] : No change in mental status noted [Language] : denies difficulty with language [Behavior] : denies difficulty with behavior [Learning/Retaining New Information] : denies difficulty learning/retaining new information [Handling Complex Tasks] : denies difficulty handling complex tasks [Reasoning] : denies difficulty with reasoning [Spatial Ability and Orientation] : denies difficulty with spatial ability and orientation [Sexually Active] : not sexually active [Sunscreen] : does not use sunscreen [Travel to Developing Areas] : does not  travel to developing areas [TB Exposure] : is not being exposed to tuberculosis [Caregiver Concerns] : does not have caregiver concerns [MammogramDate] : 11/22

## 2023-04-09 LAB
BASOPHILS # BLD AUTO: 0.06 K/UL
BASOPHILS NFR BLD AUTO: 1 %
CHOLEST SERPL-MCNC: 159 MG/DL
EOSINOPHIL # BLD AUTO: 0.1 K/UL
EOSINOPHIL NFR BLD AUTO: 1.6 %
HCT VFR BLD CALC: 41.5 %
HDLC SERPL-MCNC: 78 MG/DL
HGB BLD-MCNC: 13.7 G/DL
IMM GRANULOCYTES NFR BLD AUTO: 0.3 %
LDLC SERPL CALC-MCNC: 68 MG/DL
LYMPHOCYTES # BLD AUTO: 1.18 K/UL
LYMPHOCYTES NFR BLD AUTO: 18.8 %
MAN DIFF?: NORMAL
MCHC RBC-ENTMCNC: 31.6 PG
MCHC RBC-ENTMCNC: 33 GM/DL
MCV RBC AUTO: 95.8 FL
MONOCYTES # BLD AUTO: 0.52 K/UL
MONOCYTES NFR BLD AUTO: 8.3 %
NEUTROPHILS # BLD AUTO: 4.39 K/UL
NEUTROPHILS NFR BLD AUTO: 70 %
NONHDLC SERPL-MCNC: 82 MG/DL
PLATELET # BLD AUTO: 245 K/UL
RBC # BLD: 4.33 M/UL
RBC # FLD: 11.8 %
T3FREE SERPL-MCNC: 2.88 PG/ML
T4 FREE SERPL-MCNC: 1.7 NG/DL
TRIGL SERPL-MCNC: 67 MG/DL
TSH SERPL-ACNC: 0.96 UIU/ML
WBC # FLD AUTO: 6.27 K/UL

## 2023-04-16 ENCOUNTER — RX RENEWAL (OUTPATIENT)
Age: 88
End: 2023-04-16

## 2023-04-25 ENCOUNTER — RX RENEWAL (OUTPATIENT)
Age: 88
End: 2023-04-25

## 2023-04-25 LAB
ALBUMIN SERPL ELPH-MCNC: 4.5 G/DL
ALP BLD-CCNC: 55 U/L
ALT SERPL-CCNC: 9 U/L
ANION GAP SERPL CALC-SCNC: 13 MMOL/L
AST SERPL-CCNC: 18 U/L
BILIRUB SERPL-MCNC: 1.6 MG/DL
BUN SERPL-MCNC: 19 MG/DL
CALCIUM SERPL-MCNC: 9.9 MG/DL
CHLORIDE SERPL-SCNC: 93 MMOL/L
CO2 SERPL-SCNC: 29 MMOL/L
CREAT SERPL-MCNC: 0.82 MG/DL
EGFR: 70 ML/MIN/1.73M2
GLUCOSE SERPL-MCNC: 112 MG/DL
POTASSIUM SERPL-SCNC: 4.3 MMOL/L
PROT SERPL-MCNC: 7.5 G/DL
SODIUM SERPL-SCNC: 134 MMOL/L

## 2023-04-30 ENCOUNTER — RX RENEWAL (OUTPATIENT)
Age: 88
End: 2023-04-30

## 2023-06-06 ENCOUNTER — RX RENEWAL (OUTPATIENT)
Age: 88
End: 2023-06-06

## 2023-06-13 PROBLEM — G20 PARKINSON'S DISEASE: Chronic | Status: ACTIVE | Noted: 2023-03-23

## 2023-06-13 PROBLEM — I10 ESSENTIAL (PRIMARY) HYPERTENSION: Chronic | Status: ACTIVE | Noted: 2023-03-23

## 2023-06-15 ENCOUNTER — NON-APPOINTMENT (OUTPATIENT)
Age: 88
End: 2023-06-15

## 2023-06-17 ENCOUNTER — APPOINTMENT (OUTPATIENT)
Dept: FAMILY MEDICINE | Facility: CLINIC | Age: 88
End: 2023-06-17
Payer: MEDICARE

## 2023-06-17 VITALS
SYSTOLIC BLOOD PRESSURE: 120 MMHG | OXYGEN SATURATION: 91 % | DIASTOLIC BLOOD PRESSURE: 60 MMHG | TEMPERATURE: 98 F | WEIGHT: 148 LBS | BODY MASS INDEX: 24.66 KG/M2 | HEIGHT: 65 IN | HEART RATE: 72 BPM

## 2023-06-17 PROCEDURE — 99213 OFFICE O/P EST LOW 20 MIN: CPT

## 2023-06-17 RX ORDER — ALPRAZOLAM 0.25 MG/1
0.25 TABLET ORAL
Qty: 10 | Refills: 0 | Status: ACTIVE | COMMUNITY
Start: 2023-06-17 | End: 1900-01-01

## 2023-06-17 NOTE — HISTORY OF PRESENT ILLNESS
[FreeTextEntry6] : Pt is here for med renewal.\par C/o increased anxiety since  in the hospital\par Taking the lexapro

## 2023-06-19 ENCOUNTER — APPOINTMENT (OUTPATIENT)
Dept: NEUROLOGY | Facility: CLINIC | Age: 88
End: 2023-06-19

## 2023-07-23 ENCOUNTER — RX RENEWAL (OUTPATIENT)
Age: 88
End: 2023-07-23

## 2023-07-30 ENCOUNTER — RX RENEWAL (OUTPATIENT)
Age: 88
End: 2023-07-30

## 2023-10-09 NOTE — ED PROVIDER NOTE - NSTIMEPROVIDERCAREINITIATE_GEN_ER
23-Mar-2023 20:32 VTAMA Counseling: I discussed with the patient that VTAMA is not for use in the eyes, mouth or mouth. They should call the office if they develop any signs of allergic reactions to VTAMA. The patient verbalized understanding of the proper use and possible adverse effects of VTAMA.  All of the patient's questions and concerns were addressed.

## 2023-10-11 ENCOUNTER — RX RENEWAL (OUTPATIENT)
Age: 88
End: 2023-10-11

## 2023-10-20 ENCOUNTER — RX RENEWAL (OUTPATIENT)
Age: 88
End: 2023-10-20

## 2023-12-08 ENCOUNTER — APPOINTMENT (OUTPATIENT)
Dept: FAMILY MEDICINE | Facility: CLINIC | Age: 88
End: 2023-12-08
Payer: MEDICARE

## 2023-12-08 DIAGNOSIS — Z23 ENCOUNTER FOR IMMUNIZATION: ICD-10-CM

## 2023-12-08 PROCEDURE — 81003 URINALYSIS AUTO W/O SCOPE: CPT | Mod: QW

## 2023-12-08 PROCEDURE — G0008: CPT

## 2023-12-08 PROCEDURE — 90715 TDAP VACCINE 7 YRS/> IM: CPT | Mod: GY

## 2023-12-08 PROCEDURE — 90472 IMMUNIZATION ADMIN EACH ADD: CPT

## 2023-12-08 PROCEDURE — 99214 OFFICE O/P EST MOD 30 MIN: CPT | Mod: 25

## 2023-12-08 PROCEDURE — 36415 COLL VENOUS BLD VENIPUNCTURE: CPT

## 2023-12-08 PROCEDURE — 90662 IIV NO PRSV INCREASED AG IM: CPT

## 2023-12-11 LAB
CHOLEST SERPL-MCNC: 149 MG/DL
HDLC SERPL-MCNC: 88 MG/DL
LDLC SERPL CALC-MCNC: 48 MG/DL
NONHDLC SERPL-MCNC: 62 MG/DL
T3FREE SERPL-MCNC: 2.54 PG/ML
T4 FREE SERPL-MCNC: 1.4 NG/DL
TESTOST SERPL-MCNC: 55.9 NG/DL
TRIGL SERPL-MCNC: 71 MG/DL

## 2023-12-12 ENCOUNTER — RX RENEWAL (OUTPATIENT)
Age: 88
End: 2023-12-12

## 2024-01-16 ENCOUNTER — RX RENEWAL (OUTPATIENT)
Age: 89
End: 2024-01-16

## 2024-02-09 ENCOUNTER — APPOINTMENT (OUTPATIENT)
Dept: FAMILY MEDICINE | Facility: CLINIC | Age: 89
End: 2024-02-09
Payer: MEDICARE

## 2024-02-09 VITALS
HEART RATE: 69 BPM | SYSTOLIC BLOOD PRESSURE: 118 MMHG | DIASTOLIC BLOOD PRESSURE: 78 MMHG | BODY MASS INDEX: 24.66 KG/M2 | HEIGHT: 65 IN | OXYGEN SATURATION: 97 % | WEIGHT: 148 LBS

## 2024-02-09 DIAGNOSIS — L72.3 SEBACEOUS CYST: ICD-10-CM

## 2024-02-09 DIAGNOSIS — M17.10 UNILATERAL PRIMARY OSTEOARTHRITIS, UNSPECIFIED KNEE: ICD-10-CM

## 2024-02-09 PROCEDURE — G2211 COMPLEX E/M VISIT ADD ON: CPT

## 2024-02-09 PROCEDURE — 99214 OFFICE O/P EST MOD 30 MIN: CPT

## 2024-02-09 NOTE — PHYSICAL EXAM
[Normal] : no respiratory distress, lungs were clear to auscultation bilaterally and no accessory muscle use [de-identified] : large sebbacious cyst to back

## 2024-02-09 NOTE — PLAN
[FreeTextEntry1] : Advise Drained sebacious matterial F/u if becomes infected. No desire to have it removed.

## 2024-02-21 ENCOUNTER — RX RENEWAL (OUTPATIENT)
Age: 89
End: 2024-02-21

## 2024-02-21 RX ORDER — SIMVASTATIN 40 MG/1
40 TABLET, FILM COATED ORAL
Qty: 84 | Refills: 1 | Status: ACTIVE | COMMUNITY
Start: 2017-10-26 | End: 1900-01-01

## 2024-04-11 ENCOUNTER — NON-APPOINTMENT (OUTPATIENT)
Age: 89
End: 2024-04-11

## 2024-04-11 ENCOUNTER — TRANSCRIPTION ENCOUNTER (OUTPATIENT)
Age: 89
End: 2024-04-11

## 2024-04-11 ENCOUNTER — APPOINTMENT (OUTPATIENT)
Dept: FAMILY MEDICINE | Facility: CLINIC | Age: 89
End: 2024-04-11
Payer: MEDICARE

## 2024-04-11 VITALS
DIASTOLIC BLOOD PRESSURE: 60 MMHG | OXYGEN SATURATION: 97 % | HEIGHT: 65 IN | SYSTOLIC BLOOD PRESSURE: 122 MMHG | TEMPERATURE: 98.8 F | BODY MASS INDEX: 23.32 KG/M2 | WEIGHT: 140 LBS | HEART RATE: 82 BPM

## 2024-04-11 DIAGNOSIS — F33.9 MAJOR DEPRESSIVE DISORDER, RECURRENT, UNSPECIFIED: ICD-10-CM

## 2024-04-11 DIAGNOSIS — I10 ESSENTIAL (PRIMARY) HYPERTENSION: ICD-10-CM

## 2024-04-11 DIAGNOSIS — G20.A1 PARKINSON'S DISEASE WITHOUT DYSKINESIA, WITHOUT MENTION OF FLUCTUATIONS: ICD-10-CM

## 2024-04-11 DIAGNOSIS — F41.1 GENERALIZED ANXIETY DISORDER: ICD-10-CM

## 2024-04-11 DIAGNOSIS — E78.5 HYPERLIPIDEMIA, UNSPECIFIED: ICD-10-CM

## 2024-04-11 PROCEDURE — G0439: CPT

## 2024-04-11 PROCEDURE — 99214 OFFICE O/P EST MOD 30 MIN: CPT | Mod: 25

## 2024-04-11 PROCEDURE — 36415 COLL VENOUS BLD VENIPUNCTURE: CPT

## 2024-04-11 PROCEDURE — 81003 URINALYSIS AUTO W/O SCOPE: CPT | Mod: QW

## 2024-04-11 PROCEDURE — 93000 ELECTROCARDIOGRAM COMPLETE: CPT

## 2024-04-11 NOTE — HEALTH RISK ASSESSMENT
[Yes] : Yes [4 or more  times a week (4 pts)] : 4 or more  times a week (4 points) [1 or 2 (0 pts)] : 1 or 2 (0 points) [Never (0 pts)] : Never (0 points) [No] : In the past 12 months have you used drugs other than those required for medical reasons? No [0] : 1) Little interest or pleasure doing things: Not at all (0) [1] : 2) Feeling down, depressed, or hopeless for several days (1) [PHQ-2 Negative - No further assessment needed] : PHQ-2 Negative - No further assessment needed [Patient reported mammogram was normal] : Patient reported mammogram was normal [HIV test declined] : HIV test declined [Hepatitis C test declined] : Hepatitis C test declined [With Family] : lives with family [# of Members in Household ___] :  household currently consist of [unfilled] member(s) [Retired] : retired [College] : College [] :  [# Of Children ___] : has [unfilled] children [Feels Safe at Home] : Feels safe at home [Fully functional (bathing, dressing, toileting, transferring, walking, feeding)] : Fully functional (bathing, dressing, toileting, transferring, walking, feeding) [Fully functional (using the telephone, shopping, preparing meals, housekeeping, doing laundry, using] : Fully functional and needs no help or supervision to perform IADLs (using the telephone, shopping, preparing meals, housekeeping, doing laundry, using transportation, managing medications and managing finances) [Reports changes in hearing] : Reports changes in hearing [Reports normal functional visual acuity (ie: able to read med bottle)] : Reports normal functional visual acuity [Smoke Detector] : smoke detector [Carbon Monoxide Detector] : carbon monoxide detector [Guns at Home] : guns at home [Seat Belt] :  uses seat belt [Never] : Never [Very Good] : ~his/her~  mood as very good [No falls in past year] : Patient reported no falls in the past year [Audit-CScore] : 4 [[id number]] : 1 [Change in mental status noted] : No change in mental status noted [Language] : denies difficulty with language [Behavior] : denies difficulty with behavior [Learning/Retaining New Information] : denies difficulty learning/retaining new information [Handling Complex Tasks] : denies difficulty handling complex tasks [Reasoning] : denies difficulty with reasoning [Spatial Ability and Orientation] : denies difficulty with spatial ability and orientation [Reports changes in vision] : Reports no changes in vision [Reports changes in dental health] : Reports no changes in dental health [Sunscreen] : does not use sunscreen [TB Exposure] : is not being exposed to tuberculosis [MammogramDate] : 01/22 [With Patient/Caregiver] : , with patient/caregiver [Designated Healthcare Proxy] : Designated healthcare proxy [Relationship: ___] : Relationship: [unfilled] [AdvancecareDate] : 04/24

## 2024-04-12 ENCOUNTER — RX RENEWAL (OUTPATIENT)
Age: 89
End: 2024-04-12

## 2024-04-12 LAB
ALBUMIN SERPL ELPH-MCNC: 4.4 G/DL
ALP BLD-CCNC: 56 U/L
ALT SERPL-CCNC: 13 U/L
ANION GAP SERPL CALC-SCNC: 12 MMOL/L
AST SERPL-CCNC: 21 U/L
BASOPHILS # BLD AUTO: 0.06 K/UL
BASOPHILS NFR BLD AUTO: 0.8 %
BILIRUB SERPL-MCNC: 1.3 MG/DL
BUN SERPL-MCNC: 25 MG/DL
CALCIUM SERPL-MCNC: 9.8 MG/DL
CHLORIDE SERPL-SCNC: 100 MMOL/L
CHOLEST SERPL-MCNC: 167 MG/DL
CO2 SERPL-SCNC: 29 MMOL/L
CREAT SERPL-MCNC: 0.68 MG/DL
EGFR: 84 ML/MIN/1.73M2
EOSINOPHIL # BLD AUTO: 0.28 K/UL
EOSINOPHIL NFR BLD AUTO: 4 %
GLUCOSE SERPL-MCNC: 100 MG/DL
HCT VFR BLD CALC: 41 %
HDLC SERPL-MCNC: 89 MG/DL
HGB BLD-MCNC: 12.9 G/DL
IMM GRANULOCYTES NFR BLD AUTO: 0.3 %
LDLC SERPL CALC-MCNC: 68 MG/DL
LYMPHOCYTES # BLD AUTO: 1.26 K/UL
LYMPHOCYTES NFR BLD AUTO: 17.8 %
MAN DIFF?: NORMAL
MCHC RBC-ENTMCNC: 31.5 GM/DL
MCHC RBC-ENTMCNC: 31.7 PG
MCV RBC AUTO: 100.7 FL
MONOCYTES # BLD AUTO: 0.56 K/UL
MONOCYTES NFR BLD AUTO: 7.9 %
NEUTROPHILS # BLD AUTO: 4.89 K/UL
NEUTROPHILS NFR BLD AUTO: 69.2 %
NONHDLC SERPL-MCNC: 78 MG/DL
PLATELET # BLD AUTO: 227 K/UL
POTASSIUM SERPL-SCNC: 3.8 MMOL/L
PROT SERPL-MCNC: 7.3 G/DL
RBC # BLD: 4.07 M/UL
RBC # FLD: 12.6 %
SODIUM SERPL-SCNC: 140 MMOL/L
T3FREE SERPL-MCNC: 2.64 PG/ML
T4 FREE SERPL-MCNC: 1.4 NG/DL
TRIGL SERPL-MCNC: 49 MG/DL
TSH SERPL-ACNC: 1.5 UIU/ML
WBC # FLD AUTO: 7.07 K/UL

## 2024-04-12 RX ORDER — ESCITALOPRAM OXALATE 20 MG/1
20 TABLET ORAL
Qty: 90 | Refills: 0 | Status: ACTIVE | COMMUNITY
Start: 2017-12-01 | End: 1900-01-01

## 2024-04-12 RX ORDER — LISINOPRIL AND HYDROCHLOROTHIAZIDE TABLETS 20; 12.5 MG/1; MG/1
20-12.5 TABLET ORAL DAILY
Qty: 90 | Refills: 0 | Status: ACTIVE | COMMUNITY
Start: 2020-05-14 | End: 1900-01-01

## 2024-04-12 RX ORDER — CARVEDILOL 25 MG/1
25 TABLET, FILM COATED ORAL
Qty: 180 | Refills: 0 | Status: ACTIVE | COMMUNITY
Start: 2017-12-15 | End: 1900-01-01

## 2024-06-03 NOTE — ED ADULT NURSE NOTE - CHPI ED NUR SYMPTOMS NEG
Done.   
Susan from [employer] scheduling calling. States pt needs a diagnostic mammogram order. Please advise.   
no abdominal distension/no blood in stool/no burning urination/no chills/no diarrhea/no dysuria/no hematuria

## 2024-06-14 ENCOUNTER — RX RENEWAL (OUTPATIENT)
Age: 89
End: 2024-06-14

## 2024-06-14 RX ORDER — CARBIDOPA AND LEVODOPA 25; 100 MG/1; MG/1
25-100 TABLET ORAL
Qty: 360 | Refills: 1 | Status: ACTIVE | COMMUNITY
Start: 2020-12-15 | End: 1900-01-01

## 2024-07-13 ENCOUNTER — RX RENEWAL (OUTPATIENT)
Age: 89
End: 2024-07-13

## 2024-10-09 ENCOUNTER — APPOINTMENT (OUTPATIENT)
Dept: NEUROLOGY | Facility: CLINIC | Age: 89
End: 2024-10-09
Payer: MEDICARE

## 2024-10-09 VITALS
BODY MASS INDEX: 23.32 KG/M2 | HEIGHT: 65 IN | DIASTOLIC BLOOD PRESSURE: 84 MMHG | HEART RATE: 71 BPM | SYSTOLIC BLOOD PRESSURE: 142 MMHG | TEMPERATURE: 98.2 F | WEIGHT: 140 LBS

## 2024-10-09 DIAGNOSIS — R41.3 OTHER AMNESIA: ICD-10-CM

## 2024-10-09 DIAGNOSIS — G20.A1 PARKINSON'S DISEASE WITHOUT DYSKINESIA, WITHOUT MENTION OF FLUCTUATIONS: ICD-10-CM

## 2024-10-09 PROCEDURE — G2211 COMPLEX E/M VISIT ADD ON: CPT

## 2024-10-09 PROCEDURE — 99214 OFFICE O/P EST MOD 30 MIN: CPT

## 2024-10-18 ENCOUNTER — RX RENEWAL (OUTPATIENT)
Age: 89
End: 2024-10-18

## 2024-10-25 ENCOUNTER — APPOINTMENT (OUTPATIENT)
Dept: FAMILY MEDICINE | Facility: CLINIC | Age: 89
End: 2024-10-25
Payer: MEDICARE

## 2024-10-25 VITALS
HEIGHT: 65 IN | OXYGEN SATURATION: 96 % | HEART RATE: 68 BPM | DIASTOLIC BLOOD PRESSURE: 70 MMHG | WEIGHT: 140 LBS | TEMPERATURE: 98.2 F | SYSTOLIC BLOOD PRESSURE: 100 MMHG | BODY MASS INDEX: 23.32 KG/M2

## 2024-10-25 DIAGNOSIS — Z23 ENCOUNTER FOR IMMUNIZATION: ICD-10-CM

## 2024-10-25 DIAGNOSIS — G20.A1 PARKINSON'S DISEASE WITHOUT DYSKINESIA, WITHOUT MENTION OF FLUCTUATIONS: ICD-10-CM

## 2024-10-25 DIAGNOSIS — Z01.818 ENCOUNTER FOR OTHER PREPROCEDURAL EXAMINATION: ICD-10-CM

## 2024-10-25 DIAGNOSIS — I10 ESSENTIAL (PRIMARY) HYPERTENSION: ICD-10-CM

## 2024-10-25 DIAGNOSIS — F33.9 MAJOR DEPRESSIVE DISORDER, RECURRENT, UNSPECIFIED: ICD-10-CM

## 2024-10-25 PROCEDURE — G0008: CPT

## 2024-10-25 PROCEDURE — 93000 ELECTROCARDIOGRAM COMPLETE: CPT

## 2024-10-25 PROCEDURE — 99215 OFFICE O/P EST HI 40 MIN: CPT

## 2024-10-25 PROCEDURE — 90662 IIV NO PRSV INCREASED AG IM: CPT

## 2025-01-17 ENCOUNTER — RX RENEWAL (OUTPATIENT)
Age: 89
End: 2025-01-17

## 2025-01-22 ENCOUNTER — APPOINTMENT (OUTPATIENT)
Dept: FAMILY MEDICINE | Facility: CLINIC | Age: 89
End: 2025-01-22
Payer: MEDICARE

## 2025-01-22 VITALS
HEART RATE: 75 BPM | BODY MASS INDEX: 23.32 KG/M2 | WEIGHT: 140 LBS | OXYGEN SATURATION: 94 % | DIASTOLIC BLOOD PRESSURE: 70 MMHG | SYSTOLIC BLOOD PRESSURE: 140 MMHG | HEIGHT: 65 IN | TEMPERATURE: 97.4 F

## 2025-01-22 DIAGNOSIS — I10 ESSENTIAL (PRIMARY) HYPERTENSION: ICD-10-CM

## 2025-01-22 DIAGNOSIS — F33.9 MAJOR DEPRESSIVE DISORDER, RECURRENT, UNSPECIFIED: ICD-10-CM

## 2025-01-22 DIAGNOSIS — G20.A1 PARKINSON'S DISEASE WITHOUT DYSKINESIA, WITHOUT MENTION OF FLUCTUATIONS: ICD-10-CM

## 2025-01-22 DIAGNOSIS — R41.3 OTHER AMNESIA: ICD-10-CM

## 2025-01-22 PROCEDURE — 99214 OFFICE O/P EST MOD 30 MIN: CPT

## 2025-01-22 RX ORDER — VENLAFAXINE HYDROCHLORIDE 75 MG/1
75 CAPSULE, EXTENDED RELEASE ORAL
Qty: 270 | Refills: 0 | Status: ACTIVE | COMMUNITY
Start: 2025-01-22 | End: 1900-01-01

## 2025-02-13 ENCOUNTER — RX RENEWAL (OUTPATIENT)
Age: 89
End: 2025-02-13

## 2025-04-16 ENCOUNTER — RX RENEWAL (OUTPATIENT)
Age: 89
End: 2025-04-16

## 2025-04-21 ENCOUNTER — RX RENEWAL (OUTPATIENT)
Age: 89
End: 2025-04-21

## 2025-04-23 ENCOUNTER — NON-APPOINTMENT (OUTPATIENT)
Age: 89
End: 2025-04-23

## 2025-04-23 ENCOUNTER — APPOINTMENT (OUTPATIENT)
Dept: FAMILY MEDICINE | Facility: CLINIC | Age: 89
End: 2025-04-23
Payer: MEDICARE

## 2025-04-23 VITALS
BODY MASS INDEX: 23.32 KG/M2 | TEMPERATURE: 98 F | HEIGHT: 65 IN | HEART RATE: 77 BPM | SYSTOLIC BLOOD PRESSURE: 120 MMHG | OXYGEN SATURATION: 96 % | WEIGHT: 140 LBS | DIASTOLIC BLOOD PRESSURE: 60 MMHG

## 2025-04-23 DIAGNOSIS — F33.9 MAJOR DEPRESSIVE DISORDER, RECURRENT, UNSPECIFIED: ICD-10-CM

## 2025-04-23 DIAGNOSIS — I10 ESSENTIAL (PRIMARY) HYPERTENSION: ICD-10-CM

## 2025-04-23 DIAGNOSIS — Z00.00 ENCOUNTER FOR GENERAL ADULT MEDICAL EXAMINATION W/OUT ABNORMAL FINDINGS: ICD-10-CM

## 2025-04-23 DIAGNOSIS — E78.5 HYPERLIPIDEMIA, UNSPECIFIED: ICD-10-CM

## 2025-04-23 DIAGNOSIS — R29.6 REPEATED FALLS: ICD-10-CM

## 2025-04-23 DIAGNOSIS — R41.3 OTHER AMNESIA: ICD-10-CM

## 2025-04-23 DIAGNOSIS — Z01.818 ENCOUNTER FOR OTHER PREPROCEDURAL EXAMINATION: ICD-10-CM

## 2025-04-23 DIAGNOSIS — G20.A1 PARKINSON'S DISEASE WITHOUT DYSKINESIA, WITHOUT MENTION OF FLUCTUATIONS: ICD-10-CM

## 2025-04-23 PROCEDURE — 99213 OFFICE O/P EST LOW 20 MIN: CPT | Mod: 25

## 2025-04-23 PROCEDURE — G0439: CPT

## 2025-04-23 PROCEDURE — 81003 URINALYSIS AUTO W/O SCOPE: CPT | Mod: QW

## 2025-04-23 PROCEDURE — 93000 ELECTROCARDIOGRAM COMPLETE: CPT

## 2025-04-23 PROCEDURE — 36415 COLL VENOUS BLD VENIPUNCTURE: CPT

## 2025-04-24 LAB
ALBUMIN SERPL ELPH-MCNC: 4 G/DL
ALP BLD-CCNC: 66 U/L
ALT SERPL-CCNC: 13 U/L
ANION GAP SERPL CALC-SCNC: 14 MMOL/L
AST SERPL-CCNC: 21 U/L
BASOPHILS # BLD AUTO: 0 K/UL
BASOPHILS NFR BLD AUTO: 0 %
BILIRUB SERPL-MCNC: 1 MG/DL
BUN SERPL-MCNC: 28 MG/DL
CALCIUM SERPL-MCNC: 9.5 MG/DL
CHLORIDE SERPL-SCNC: 100 MMOL/L
CHOLEST SERPL-MCNC: 153 MG/DL
CO2 SERPL-SCNC: 29 MMOL/L
CREAT SERPL-MCNC: 0.77 MG/DL
EGFRCR SERPLBLD CKD-EPI 2021: 74 ML/MIN/1.73M2
EOSINOPHIL # BLD AUTO: 0 K/UL
EOSINOPHIL NFR BLD AUTO: 0 %
GLUCOSE SERPL-MCNC: 112 MG/DL
HCT VFR BLD CALC: 39.7 %
HDLC SERPL-MCNC: 69 MG/DL
HGB BLD-MCNC: 12.7 G/DL
IMM GRANULOCYTES NFR BLD AUTO: 0.2 %
LDLC SERPL-MCNC: 71 MG/DL
LYMPHOCYTES # BLD AUTO: 1.33 K/UL
LYMPHOCYTES NFR BLD AUTO: 22.4 %
MAN DIFF?: NORMAL
MCHC RBC-ENTMCNC: 32 G/DL
MCHC RBC-ENTMCNC: 32.1 PG
MCV RBC AUTO: 100.3 FL
MONOCYTES # BLD AUTO: 0.43 K/UL
MONOCYTES NFR BLD AUTO: 7.2 %
NEUTROPHILS # BLD AUTO: 4.18 K/UL
NEUTROPHILS NFR BLD AUTO: 70.2 %
NONHDLC SERPL-MCNC: 84 MG/DL
PLATELET # BLD AUTO: 210 K/UL
POTASSIUM SERPL-SCNC: 3.5 MMOL/L
PROT SERPL-MCNC: 6.8 G/DL
RBC # BLD: 3.96 M/UL
RBC # FLD: 12.8 %
SODIUM SERPL-SCNC: 144 MMOL/L
T3FREE SERPL-MCNC: 2.78 PG/ML
T4 FREE SERPL-MCNC: 1.3 NG/DL
TRIGL SERPL-MCNC: 65 MG/DL
TSH SERPL-ACNC: 1.39 UIU/ML
WBC # FLD AUTO: 5.95 K/UL

## 2025-06-11 ENCOUNTER — APPOINTMENT (OUTPATIENT)
Dept: FAMILY MEDICINE | Facility: CLINIC | Age: 89
End: 2025-06-11

## 2025-06-11 VITALS — SYSTOLIC BLOOD PRESSURE: 124 MMHG | DIASTOLIC BLOOD PRESSURE: 82 MMHG

## 2025-06-11 VITALS
TEMPERATURE: 98.1 F | SYSTOLIC BLOOD PRESSURE: 100 MMHG | HEIGHT: 65 IN | HEART RATE: 76 BPM | OXYGEN SATURATION: 94 % | WEIGHT: 140 LBS | DIASTOLIC BLOOD PRESSURE: 60 MMHG | BODY MASS INDEX: 23.32 KG/M2

## 2025-06-11 VITALS — DIASTOLIC BLOOD PRESSURE: 76 MMHG | SYSTOLIC BLOOD PRESSURE: 126 MMHG

## 2025-06-11 PROBLEM — R55 SYNCOPE, UNSPECIFIED SYNCOPE TYPE: Status: ACTIVE | Noted: 2025-06-11

## 2025-06-11 PROBLEM — I95.1 SYMPATHOTONIC ORTHOSTATIC HYPOTENSION: Status: ACTIVE | Noted: 2025-06-11

## 2025-06-11 PROCEDURE — 99214 OFFICE O/P EST MOD 30 MIN: CPT

## 2025-06-11 PROCEDURE — G2211 COMPLEX E/M VISIT ADD ON: CPT

## 2025-06-11 PROCEDURE — 36415 COLL VENOUS BLD VENIPUNCTURE: CPT

## 2025-06-12 LAB
25(OH)D3 SERPL-MCNC: 31.8 NG/ML
ALBUMIN SERPL ELPH-MCNC: 3.6 G/DL
ALP BLD-CCNC: 83 U/L
ALT SERPL-CCNC: 16 U/L
ANION GAP SERPL CALC-SCNC: 17 MMOL/L
AST SERPL-CCNC: 28 U/L
BASOPHILS # BLD AUTO: 0 K/UL
BASOPHILS NFR BLD AUTO: 0 %
BILIRUB SERPL-MCNC: 0.8 MG/DL
BUN SERPL-MCNC: 30 MG/DL
CALCIUM SERPL-MCNC: 9.5 MG/DL
CHLORIDE SERPL-SCNC: 96 MMOL/L
CO2 SERPL-SCNC: 27 MMOL/L
CREAT SERPL-MCNC: 0.82 MG/DL
EGFRCR SERPLBLD CKD-EPI 2021: 69 ML/MIN/1.73M2
EOSINOPHIL # BLD AUTO: 0 K/UL
EOSINOPHIL NFR BLD AUTO: 0 %
FOLATE SERPL-MCNC: >20 NG/ML
GLUCOSE SERPL-MCNC: 98 MG/DL
HCT VFR BLD CALC: 36.2 %
HGB BLD-MCNC: 11.3 G/DL
IMM GRANULOCYTES NFR BLD AUTO: 0.4 %
LYMPHOCYTES # BLD AUTO: 0.96 K/UL
LYMPHOCYTES NFR BLD AUTO: 13.7 %
MAN DIFF?: NORMAL
MCHC RBC-ENTMCNC: 31.2 G/DL
MCHC RBC-ENTMCNC: 31.9 PG
MCV RBC AUTO: 102.3 FL
MONOCYTES # BLD AUTO: 0.63 K/UL
MONOCYTES NFR BLD AUTO: 9 %
NEUTROPHILS # BLD AUTO: 5.41 K/UL
NEUTROPHILS NFR BLD AUTO: 76.9 %
PLATELET # BLD AUTO: 224 K/UL
POTASSIUM SERPL-SCNC: 3.6 MMOL/L
PROT SERPL-MCNC: 6.5 G/DL
RBC # BLD: 3.54 M/UL
RBC # FLD: 12.8 %
SODIUM SERPL-SCNC: 139 MMOL/L
T3FREE SERPL-MCNC: 2.88 PG/ML
TSH SERPL-ACNC: 1.73 UIU/ML
VIT B12 SERPL-MCNC: 970 PG/ML
WBC # FLD AUTO: 7.03 K/UL

## 2025-07-18 ENCOUNTER — RX RENEWAL (OUTPATIENT)
Age: 89
End: 2025-07-18

## 2025-08-03 ENCOUNTER — EMERGENCY (EMERGENCY)
Facility: HOSPITAL | Age: 89
LOS: 0 days | Discharge: HOME | End: 2025-08-03
Attending: EMERGENCY MEDICINE
Payer: MEDICARE

## 2025-08-03 VITALS
OXYGEN SATURATION: 100 % | DIASTOLIC BLOOD PRESSURE: 53 MMHG | SYSTOLIC BLOOD PRESSURE: 138 MMHG | HEART RATE: 78 BPM | RESPIRATION RATE: 18 BRPM | TEMPERATURE: 98 F

## 2025-08-03 PROCEDURE — 99284 EMERGENCY DEPT VISIT MOD MDM: CPT | Mod: 25

## 2025-08-03 PROCEDURE — 73140 X-RAY EXAM OF FINGER(S): CPT | Mod: RT

## 2025-08-03 PROCEDURE — 26770 TREAT FINGER DISLOCATION: CPT | Mod: F8

## 2025-08-03 PROCEDURE — 99284 EMERGENCY DEPT VISIT MOD MDM: CPT | Mod: 57

## 2025-08-03 PROCEDURE — 26770 TREAT FINGER DISLOCATION: CPT | Mod: 54,F8

## 2025-08-03 PROCEDURE — 73140 X-RAY EXAM OF FINGER(S): CPT | Mod: 26,RT

## 2025-08-23 ENCOUNTER — RX RENEWAL (OUTPATIENT)
Age: 89
End: 2025-08-23

## 2025-09-04 ENCOUNTER — TRANSCRIPTION ENCOUNTER (OUTPATIENT)
Age: 89
End: 2025-09-04